# Patient Record
Sex: MALE | Race: WHITE | Employment: OTHER | URBAN - NONMETROPOLITAN AREA
[De-identification: names, ages, dates, MRNs, and addresses within clinical notes are randomized per-mention and may not be internally consistent; named-entity substitution may affect disease eponyms.]

---

## 2023-12-04 ENCOUNTER — APPOINTMENT (OUTPATIENT)
Dept: CT IMAGING | Age: 83
DRG: 085 | End: 2023-12-04
Payer: MEDICARE

## 2023-12-04 ENCOUNTER — HOSPITAL ENCOUNTER (INPATIENT)
Age: 83
LOS: 7 days | Discharge: HOSPICE | DRG: 085 | End: 2023-12-11
Attending: EMERGENCY MEDICINE | Admitting: HOSPITALIST
Payer: MEDICARE

## 2023-12-04 ENCOUNTER — APPOINTMENT (OUTPATIENT)
Dept: GENERAL RADIOLOGY | Age: 83
DRG: 085 | End: 2023-12-04
Payer: MEDICARE

## 2023-12-04 DIAGNOSIS — S02.91XB OPEN FRACTURE OF SKULL, UNSPECIFIED BONE, INITIAL ENCOUNTER (HCC): ICD-10-CM

## 2023-12-04 DIAGNOSIS — S01.01XA LACERATION OF SCALP, INITIAL ENCOUNTER: ICD-10-CM

## 2023-12-04 DIAGNOSIS — S09.90XA INJURY OF HEAD, INITIAL ENCOUNTER: Primary | ICD-10-CM

## 2023-12-04 DIAGNOSIS — I62.9 INTRACRANIAL HEMORRHAGE (HCC): ICD-10-CM
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
## 2023-12-04 LAB
ALBUMIN SERPL-MCNC: 4.2 G/DL (ref 3.5–5.2)
ALP SERPL-CCNC: 68 U/L (ref 40–130)
ALT SERPL-CCNC: 19 U/L (ref 5–41)
ANION GAP SERPL CALCULATED.3IONS-SCNC: 12 MMOL/L (ref 7–19)
APTT PPP: 29.2 SEC (ref 26–36.2)
AST SERPL-CCNC: 28 U/L (ref 5–40)
BASOPHILS # BLD: 0 K/UL (ref 0–0.2)
BASOPHILS NFR BLD: 0.6 % (ref 0–1)
BILIRUB SERPL-MCNC: 0.6 MG/DL (ref 0.2–1.2)
BILIRUB UR QL STRIP: NEGATIVE
BUN SERPL-MCNC: 16 MG/DL (ref 8–23)
CALCIUM SERPL-MCNC: 9.1 MG/DL (ref 8.8–10.2)
CHLORIDE SERPL-SCNC: 94 MMOL/L (ref 98–111)
CK SERPL-CCNC: 123 U/L (ref 39–308)
CLARITY UR: CLEAR
CO2 SERPL-SCNC: 24 MMOL/L (ref 22–29)
COLOR UR: YELLOW
CREAT SERPL-MCNC: 0.5 MG/DL (ref 0.5–1.2)
EOSINOPHIL # BLD: 0.1 K/UL (ref 0–0.6)
EOSINOPHIL NFR BLD: 1.1 % (ref 0–5)
ERYTHROCYTE [DISTWIDTH] IN BLOOD BY AUTOMATED COUNT: 14.5 % (ref 11.5–14.5)
ETHANOLAMINE SERPL-MCNC: 18 MG/DL (ref 0–0.08)
GLUCOSE SERPL-MCNC: 104 MG/DL (ref 74–109)
GLUCOSE UR STRIP.AUTO-MCNC: NEGATIVE MG/DL
HCT VFR BLD AUTO: 38.5 % (ref 42–52)
HGB BLD-MCNC: 13 G/DL (ref 14–18)
HGB UR STRIP.AUTO-MCNC: NEGATIVE MG/L
IMM GRANULOCYTES # BLD: 0 K/UL
INR PPP: 1.09 (ref 0.88–1.18)
KETONES UR STRIP.AUTO-MCNC: NEGATIVE MG/DL
LEUKOCYTE ESTERASE UR QL STRIP.AUTO: NEGATIVE
LYMPHOCYTES # BLD: 1.4 K/UL (ref 1.1–4.5)
LYMPHOCYTES NFR BLD: 26.5 % (ref 20–40)
MCH RBC QN AUTO: 31.2 PG (ref 27–31)
MCHC RBC AUTO-ENTMCNC: 33.8 G/DL (ref 33–37)
MCV RBC AUTO: 92.3 FL (ref 80–94)
MONOCYTES # BLD: 0.6 K/UL (ref 0–0.9)
MONOCYTES NFR BLD: 10.5 % (ref 0–10)
NEUTROPHILS # BLD: 3.3 K/UL (ref 1.5–7.5)
NEUTS SEG NFR BLD: 60.6 % (ref 50–65)
NITRITE UR QL STRIP.AUTO: NEGATIVE
PH UR STRIP.AUTO: 5.5 [PH] (ref 5–8)
PLATELET # BLD AUTO: 211 K/UL (ref 130–400)
PMV BLD AUTO: 8.7 FL (ref 9.4–12.4)
POTASSIUM SERPL-SCNC: 4.5 MMOL/L (ref 3.5–5)
PROT SERPL-MCNC: 5.8 G/DL (ref 6.6–8.7)
PROT UR STRIP.AUTO-MCNC: NEGATIVE MG/DL
PROTHROMBIN TIME: 13.8 SEC (ref 12–14.6)
RBC # BLD AUTO: 4.17 M/UL (ref 4.7–6.1)
SODIUM SERPL-SCNC: 130 MMOL/L (ref 136–145)
SP GR UR STRIP.AUTO: 1.01 (ref 1–1.03)
T4 FREE SERPL-MCNC: 1.07 NG/DL (ref 0.93–1.7)
TROPONIN, HIGH SENSITIVITY: 18 NG/L (ref 0–22)
TSH SERPL DL<=0.005 MIU/L-ACNC: 7.42 UIU/ML (ref 0.35–5.5)
UROBILINOGEN UR STRIP.AUTO-MCNC: 1 E.U./DL
WBC # BLD AUTO: 5.4 K/UL (ref 4.8–10.8)

## 2023-12-04 PROCEDURE — 99285 EMERGENCY DEPT VISIT HI MDM: CPT

## 2023-12-04 PROCEDURE — 6360000002 HC RX W HCPCS: Performed by: EMERGENCY MEDICINE

## 2023-12-04 PROCEDURE — 36415 COLL VENOUS BLD VENIPUNCTURE: CPT

## 2023-12-04 PROCEDURE — 84439 ASSAY OF FREE THYROXINE: CPT

## 2023-12-04 PROCEDURE — 82550 ASSAY OF CK (CPK): CPT

## 2023-12-04 PROCEDURE — 80053 COMPREHEN METABOLIC PANEL: CPT

## 2023-12-04 PROCEDURE — 6360000002 HC RX W HCPCS: Performed by: NURSE PRACTITIONER

## 2023-12-04 PROCEDURE — 90715 TDAP VACCINE 7 YRS/> IM: CPT | Performed by: EMERGENCY MEDICINE

## 2023-12-04 PROCEDURE — 12002 RPR S/N/AX/GEN/TRNK2.6-7.5CM: CPT

## 2023-12-04 PROCEDURE — 85025 COMPLETE CBC W/AUTO DIFF WBC: CPT

## 2023-12-04 PROCEDURE — 0HQ0XZZ REPAIR SCALP SKIN, EXTERNAL APPROACH: ICD-10-PCS | Performed by: EMERGENCY MEDICINE

## 2023-12-04 PROCEDURE — 84443 ASSAY THYROID STIM HORMONE: CPT

## 2023-12-04 PROCEDURE — 6360000002 HC RX W HCPCS

## 2023-12-04 PROCEDURE — 2500000003 HC RX 250 WO HCPCS: Performed by: EMERGENCY MEDICINE

## 2023-12-04 PROCEDURE — 85730 THROMBOPLASTIN TIME PARTIAL: CPT

## 2023-12-04 PROCEDURE — 70450 CT HEAD/BRAIN W/O DYE: CPT

## 2023-12-04 PROCEDURE — 81003 URINALYSIS AUTO W/O SCOPE: CPT

## 2023-12-04 PROCEDURE — 84484 ASSAY OF TROPONIN QUANT: CPT

## 2023-12-04 PROCEDURE — 2580000003 HC RX 258: Performed by: EMERGENCY MEDICINE

## 2023-12-04 PROCEDURE — 72170 X-RAY EXAM OF PELVIS: CPT

## 2023-12-04 PROCEDURE — 70486 CT MAXILLOFACIAL W/O DYE: CPT

## 2023-12-04 PROCEDURE — 85610 PROTHROMBIN TIME: CPT

## 2023-12-04 PROCEDURE — 71045 X-RAY EXAM CHEST 1 VIEW: CPT

## 2023-12-04 PROCEDURE — 82077 ASSAY SPEC XCP UR&BREATH IA: CPT

## 2023-12-04 PROCEDURE — 93005 ELECTROCARDIOGRAM TRACING: CPT | Performed by: EMERGENCY MEDICINE

## 2023-12-04 PROCEDURE — 72125 CT NECK SPINE W/O DYE: CPT

## 2023-12-04 PROCEDURE — 2000000000 HC ICU R&B

## 2023-12-04 PROCEDURE — 74018 RADEX ABDOMEN 1 VIEW: CPT

## 2023-12-04 PROCEDURE — 90471 IMMUNIZATION ADMIN: CPT | Performed by: EMERGENCY MEDICINE

## 2023-12-04 RX ORDER — METOPROLOL TARTRATE 1 MG/ML
2.5 INJECTION, SOLUTION INTRAVENOUS EVERY 6 HOURS PRN
Status: DISCONTINUED | OUTPATIENT
Start: 2023-12-04 | End: 2023-12-11 | Stop reason: HOSPADM

## 2023-12-04 RX ORDER — SODIUM CHLORIDE 0.9 % (FLUSH) 0.9 %
5-40 SYRINGE (ML) INJECTION EVERY 12 HOURS SCHEDULED
Status: DISCONTINUED | OUTPATIENT
Start: 2023-12-04 | End: 2023-12-05

## 2023-12-04 RX ORDER — LORAZEPAM 2 MG/ML
INJECTION INTRAMUSCULAR
Status: COMPLETED
Start: 2023-12-04 | End: 2023-12-04

## 2023-12-04 RX ORDER — LORAZEPAM 2 MG/ML
1 INJECTION INTRAMUSCULAR ONCE
Status: DISCONTINUED | OUTPATIENT
Start: 2023-12-04 | End: 2023-12-11 | Stop reason: HOSPADM

## 2023-12-04 RX ORDER — SODIUM CHLORIDE 9 MG/ML
INJECTION, SOLUTION INTRAVENOUS PRN
Status: DISCONTINUED | OUTPATIENT
Start: 2023-12-04 | End: 2023-12-11 | Stop reason: HOSPADM

## 2023-12-04 RX ORDER — ONDANSETRON 2 MG/ML
4 INJECTION INTRAMUSCULAR; INTRAVENOUS EVERY 6 HOURS PRN
Status: DISCONTINUED | OUTPATIENT
Start: 2023-12-04 | End: 2023-12-11 | Stop reason: HOSPADM

## 2023-12-04 RX ORDER — SODIUM CHLORIDE 9 MG/ML
INJECTION, SOLUTION INTRAVENOUS CONTINUOUS
Status: DISCONTINUED | OUTPATIENT
Start: 2023-12-04 | End: 2023-12-11 | Stop reason: HOSPADM

## 2023-12-04 RX ORDER — ACETAMINOPHEN 325 MG/1
650 TABLET ORAL EVERY 6 HOURS PRN
Status: DISCONTINUED | OUTPATIENT
Start: 2023-12-04 | End: 2023-12-11 | Stop reason: HOSPADM

## 2023-12-04 RX ORDER — MAGNESIUM SULFATE IN WATER 40 MG/ML
2000 INJECTION, SOLUTION INTRAVENOUS PRN
Status: DISCONTINUED | OUTPATIENT
Start: 2023-12-04 | End: 2023-12-11 | Stop reason: HOSPADM

## 2023-12-04 RX ORDER — ONDANSETRON 4 MG/1
4 TABLET, ORALLY DISINTEGRATING ORAL EVERY 8 HOURS PRN
Status: DISCONTINUED | OUTPATIENT
Start: 2023-12-04 | End: 2023-12-11 | Stop reason: HOSPADM

## 2023-12-04 RX ORDER — POLYETHYLENE GLYCOL 3350 17 G/17G
17 POWDER, FOR SOLUTION ORAL DAILY PRN
Status: DISCONTINUED | OUTPATIENT
Start: 2023-12-04 | End: 2023-12-11 | Stop reason: HOSPADM

## 2023-12-04 RX ORDER — PROMETHAZINE HYDROCHLORIDE 25 MG/ML
6.25 INJECTION, SOLUTION INTRAMUSCULAR; INTRAVENOUS EVERY 6 HOURS PRN
Status: DISCONTINUED | OUTPATIENT
Start: 2023-12-04 | End: 2023-12-11 | Stop reason: HOSPADM

## 2023-12-04 RX ORDER — LORAZEPAM 2 MG/ML
2 INJECTION INTRAMUSCULAR EVERY 4 HOURS PRN
Status: DISCONTINUED | OUTPATIENT
Start: 2023-12-04 | End: 2023-12-11 | Stop reason: HOSPADM

## 2023-12-04 RX ORDER — SODIUM CHLORIDE 0.9 % (FLUSH) 0.9 %
5-40 SYRINGE (ML) INJECTION PRN
Status: DISCONTINUED | OUTPATIENT
Start: 2023-12-04 | End: 2023-12-11 | Stop reason: HOSPADM

## 2023-12-04 RX ORDER — LABETALOL HYDROCHLORIDE 5 MG/ML
10 INJECTION, SOLUTION INTRAVENOUS EVERY 4 HOURS PRN
Status: DISCONTINUED | OUTPATIENT
Start: 2023-12-04 | End: 2023-12-04

## 2023-12-04 RX ORDER — LORAZEPAM 2 MG/ML
1 INJECTION INTRAMUSCULAR ONCE
Status: COMPLETED | OUTPATIENT
Start: 2023-12-04 | End: 2023-12-04

## 2023-12-04 RX ORDER — POTASSIUM CHLORIDE 7.45 MG/ML
10 INJECTION INTRAVENOUS PRN
Status: DISCONTINUED | OUTPATIENT
Start: 2023-12-04 | End: 2023-12-11 | Stop reason: HOSPADM

## 2023-12-04 RX ORDER — ACETAMINOPHEN 650 MG/1
650 SUPPOSITORY RECTAL EVERY 6 HOURS PRN
Status: DISCONTINUED | OUTPATIENT
Start: 2023-12-04 | End: 2023-12-11 | Stop reason: HOSPADM

## 2023-12-04 RX ORDER — LIDOCAINE HYDROCHLORIDE AND EPINEPHRINE 10; 10 MG/ML; UG/ML
20 INJECTION, SOLUTION INFILTRATION; PERINEURAL ONCE
Status: COMPLETED | OUTPATIENT
Start: 2023-12-04 | End: 2023-12-04

## 2023-12-04 RX ORDER — LEVETIRACETAM 500 MG/5ML
1000 INJECTION, SOLUTION, CONCENTRATE INTRAVENOUS ONCE
Status: COMPLETED | OUTPATIENT
Start: 2023-12-04 | End: 2023-12-04

## 2023-12-04 RX ORDER — LEVETIRACETAM 500 MG/5ML
500 INJECTION, SOLUTION, CONCENTRATE INTRAVENOUS EVERY 12 HOURS
Status: DISCONTINUED | OUTPATIENT
Start: 2023-12-05 | End: 2023-12-05

## 2023-12-04 RX ORDER — POTASSIUM CHLORIDE 29.8 MG/ML
20 INJECTION INTRAVENOUS PRN
Status: DISCONTINUED | OUTPATIENT
Start: 2023-12-04 | End: 2023-12-11 | Stop reason: HOSPADM

## 2023-12-04 RX ADMIN — LORAZEPAM 1 MG: 2 INJECTION INTRAMUSCULAR at 22:42

## 2023-12-04 RX ADMIN — LIDOCAINE HYDROCHLORIDE,EPINEPHRINE BITARTRATE 20 ML: 10; .01 INJECTION, SOLUTION INFILTRATION; PERINEURAL at 20:42

## 2023-12-04 RX ADMIN — CEFAZOLIN 2000 MG: 2 INJECTION, POWDER, FOR SOLUTION INTRAMUSCULAR; INTRAVENOUS at 20:34

## 2023-12-04 RX ADMIN — LEVETIRACETAM 1000 MG: 100 INJECTION, SOLUTION, CONCENTRATE INTRAVENOUS at 22:49

## 2023-12-04 RX ADMIN — ONDANSETRON 4 MG: 2 INJECTION INTRAMUSCULAR; INTRAVENOUS at 21:15

## 2023-12-04 RX ADMIN — TETANUS TOXOID, REDUCED DIPHTHERIA TOXOID AND ACELLULAR PERTUSSIS VACCINE, ADSORBED 0.5 ML: 5; 2.5; 8; 8; 2.5 SUSPENSION INTRAMUSCULAR at 20:33

## 2023-12-04 RX ADMIN — LORAZEPAM 1 MG: 2 INJECTION INTRAMUSCULAR; INTRAVENOUS at 22:42

## 2023-12-05 ENCOUNTER — APPOINTMENT (OUTPATIENT)
Dept: CT IMAGING | Age: 83
DRG: 085 | End: 2023-12-05
Payer: MEDICARE

## 2023-12-05 PROBLEM — S09.90XA HEAD INJURY: Status: ACTIVE | Noted: 2023-12-05

## 2023-12-05 LAB
25(OH)D3 SERPL-MCNC: 14.7 NG/ML
ANION GAP SERPL CALCULATED.3IONS-SCNC: 10 MMOL/L (ref 7–19)
BUN SERPL-MCNC: 11 MG/DL (ref 8–23)
CALCIUM SERPL-MCNC: 8.9 MG/DL (ref 8.8–10.2)
CHLORIDE SERPL-SCNC: 96 MMOL/L (ref 98–111)
CO2 SERPL-SCNC: 24 MMOL/L (ref 22–29)
CREAT SERPL-MCNC: 0.4 MG/DL (ref 0.5–1.2)
EKG P AXIS: NORMAL DEGREES
EKG P AXIS: NORMAL DEGREES
EKG P-R INTERVAL: NORMAL MS
EKG P-R INTERVAL: NORMAL MS
EKG Q-T INTERVAL: 430 MS
EKG Q-T INTERVAL: 438 MS
EKG QRS DURATION: 116 MS
EKG QRS DURATION: 182 MS
EKG QTC CALCULATION (BAZETT): 442 MS
EKG QTC CALCULATION (BAZETT): 457 MS
EKG T AXIS: -94 DEGREES
EKG T AXIS: 76 DEGREES
ERYTHROCYTE [DISTWIDTH] IN BLOOD BY AUTOMATED COUNT: 14.2 % (ref 11.5–14.5)
GLUCOSE SERPL-MCNC: 152 MG/DL (ref 74–109)
HCT VFR BLD AUTO: 35.1 % (ref 42–52)
HGB BLD-MCNC: 12.2 G/DL (ref 14–18)
MAGNESIUM SERPL-MCNC: 2.1 MG/DL (ref 1.6–2.4)
MCH RBC QN AUTO: 31.4 PG (ref 27–31)
MCHC RBC AUTO-ENTMCNC: 34.8 G/DL (ref 33–37)
MCV RBC AUTO: 90.2 FL (ref 80–94)
OSMOLALITY SERPL CALC.SUM OF ELEC: 271 MOSM/KG (ref 275–300)
PLATELET # BLD AUTO: 208 K/UL (ref 130–400)
PMV BLD AUTO: 8.8 FL (ref 9.4–12.4)
POTASSIUM SERPL-SCNC: 4.3 MMOL/L (ref 3.5–5)
RBC # BLD AUTO: 3.89 M/UL (ref 4.7–6.1)
SODIUM SERPL-SCNC: 130 MMOL/L (ref 136–145)
TSH SERPL DL<=0.005 MIU/L-ACNC: 1.11 UIU/ML (ref 0.27–4.2)
WBC # BLD AUTO: 8.7 K/UL (ref 4.8–10.8)

## 2023-12-05 PROCEDURE — 6360000002 HC RX W HCPCS: Performed by: PSYCHIATRY & NEUROLOGY

## 2023-12-05 PROCEDURE — 83735 ASSAY OF MAGNESIUM: CPT

## 2023-12-05 PROCEDURE — 36415 COLL VENOUS BLD VENIPUNCTURE: CPT

## 2023-12-05 PROCEDURE — 70450 CT HEAD/BRAIN W/O DYE: CPT

## 2023-12-05 PROCEDURE — 2000000000 HC ICU R&B

## 2023-12-05 PROCEDURE — 80048 BASIC METABOLIC PNL TOTAL CA: CPT

## 2023-12-05 PROCEDURE — 85027 COMPLETE CBC AUTOMATED: CPT

## 2023-12-05 PROCEDURE — 83930 ASSAY OF BLOOD OSMOLALITY: CPT

## 2023-12-05 PROCEDURE — 84443 ASSAY THYROID STIM HORMONE: CPT

## 2023-12-05 PROCEDURE — 6360000002 HC RX W HCPCS: Performed by: INTERNAL MEDICINE

## 2023-12-05 PROCEDURE — 6360000002 HC RX W HCPCS: Performed by: NURSE PRACTITIONER

## 2023-12-05 PROCEDURE — 95816 EEG AWAKE AND DROWSY: CPT

## 2023-12-05 PROCEDURE — 82306 VITAMIN D 25 HYDROXY: CPT

## 2023-12-05 PROCEDURE — 99223 1ST HOSP IP/OBS HIGH 75: CPT | Performed by: NEUROLOGICAL SURGERY

## 2023-12-05 PROCEDURE — 95816 EEG AWAKE AND DROWSY: CPT | Performed by: PSYCHIATRY & NEUROLOGY

## 2023-12-05 PROCEDURE — 2500000003 HC RX 250 WO HCPCS: Performed by: HOSPITALIST

## 2023-12-05 PROCEDURE — 99223 1ST HOSP IP/OBS HIGH 75: CPT | Performed by: PSYCHIATRY & NEUROLOGY

## 2023-12-05 PROCEDURE — 93010 ELECTROCARDIOGRAM REPORT: CPT | Performed by: INTERNAL MEDICINE

## 2023-12-05 PROCEDURE — 2580000003 HC RX 258: Performed by: NURSE PRACTITIONER

## 2023-12-05 RX ORDER — MORPHINE SULFATE 2 MG/ML
2 INJECTION, SOLUTION INTRAMUSCULAR; INTRAVENOUS ONCE
Status: COMPLETED | OUTPATIENT
Start: 2023-12-05 | End: 2023-12-05

## 2023-12-05 RX ORDER — FOLIC ACID 1 MG/1
1 TABLET ORAL DAILY
Status: DISCONTINUED | OUTPATIENT
Start: 2023-12-05 | End: 2023-12-11 | Stop reason: HOSPADM

## 2023-12-05 RX ORDER — THIAMINE HYDROCHLORIDE 100 MG/ML
100 INJECTION, SOLUTION INTRAMUSCULAR; INTRAVENOUS DAILY
Status: DISCONTINUED | OUTPATIENT
Start: 2023-12-05 | End: 2023-12-11 | Stop reason: HOSPADM

## 2023-12-05 RX ORDER — MULTIVITAMIN WITH IRON
1 TABLET ORAL DAILY
Status: DISCONTINUED | OUTPATIENT
Start: 2023-12-05 | End: 2023-12-11 | Stop reason: HOSPADM

## 2023-12-05 RX ORDER — LEVETIRACETAM 500 MG/5ML
750 INJECTION, SOLUTION, CONCENTRATE INTRAVENOUS EVERY 12 HOURS
Status: DISCONTINUED | OUTPATIENT
Start: 2023-12-05 | End: 2023-12-06

## 2023-12-05 RX ORDER — ERGOCALCIFEROL 1.25 MG/1
50000 CAPSULE ORAL WEEKLY
Status: DISCONTINUED | OUTPATIENT
Start: 2023-12-05 | End: 2023-12-11 | Stop reason: HOSPADM

## 2023-12-05 RX ADMIN — METOPROLOL TARTRATE 2.5 MG: 5 INJECTION INTRAVENOUS at 03:00

## 2023-12-05 RX ADMIN — WATER 2000 MG: 1 INJECTION INTRAMUSCULAR; INTRAVENOUS; SUBCUTANEOUS at 19:58

## 2023-12-05 RX ADMIN — WATER 2000 MG: 1 INJECTION INTRAMUSCULAR; INTRAVENOUS; SUBCUTANEOUS at 04:00

## 2023-12-05 RX ADMIN — WATER 2000 MG: 1 INJECTION INTRAMUSCULAR; INTRAVENOUS; SUBCUTANEOUS at 11:59

## 2023-12-05 RX ADMIN — MORPHINE SULFATE 2 MG: 2 INJECTION, SOLUTION INTRAMUSCULAR; INTRAVENOUS at 14:32

## 2023-12-05 RX ADMIN — LEVETIRACETAM 750 MG: 100 INJECTION, SOLUTION, CONCENTRATE INTRAVENOUS at 23:02

## 2023-12-05 RX ADMIN — SODIUM CHLORIDE, PRESERVATIVE FREE 10 ML: 5 INJECTION INTRAVENOUS at 08:00

## 2023-12-05 RX ADMIN — SODIUM CHLORIDE: 9 INJECTION, SOLUTION INTRAVENOUS at 01:15

## 2023-12-05 RX ADMIN — LEVETIRACETAM 500 MG: 100 INJECTION, SOLUTION, CONCENTRATE INTRAVENOUS at 10:09

## 2023-12-05 RX ADMIN — THIAMINE HYDROCHLORIDE 100 MG: 100 INJECTION, SOLUTION INTRAMUSCULAR; INTRAVENOUS at 14:31

## 2023-12-05 ASSESSMENT — PAIN SCALES - GENERAL
PAINLEVEL_OUTOF10: 5
PAINLEVEL_OUTOF10: 0

## 2023-12-05 NOTE — PROCEDURES
ADULT INPATIENT ELECTROENCEPHALOGRAM REPORT    Patient:   Vangie Butler  MR#:    [id number]  Room #:    INPATIENT  YOB: 1940  Date of Evaluation:  12/5/2023  Referring Physician:   Jozef Owusu MD      CLINICAL INFORMATION:     This patient is a 80 y.o. male with a history of seizures. MEDICATIONS:     See MAR. RECORDING CONDITIONS:     This EEG was performed utilizing standard International 10-20 System of electrode placement, with additional channels monitored for eye movement. One channel electrocardiogram was monitored. Data was obtained, stored, and interpreted according to ACNS guidelines (J Clin Neurophysiol 2006;23(2):) utilizing referential montage recording, with reformatting to longitudinal, transverse bipolar, and referential montages as necessary for interpretation, along with the digital/automated EEG analysis. Patient tolerated entire procedure well. Photic stimulation and hyperventilation were utilized as activation procedures unless otherwise specified below. E.E.G. DESCRIPTION:     The recording was limited by significant movement and muscle artifact. The background consists of lower voltage slowing with intermixed faster beta frequencies. No overt electrographic seizure activity was seen. No overt epileptiform abnormalities were noted. Drowsiness and sleep was not demonstrated. Hyperventilation was not performed. Photic stimulation was performed and had little change on the recording. EEG INTERPRETATION:    No overt electrographic seizure activity or epileptiform abnormalities were noted.        Sunny Gill DO  Board Certified Neurologist    Date reported: 12/5/2023  Date signed: 12/5/2023

## 2023-12-05 NOTE — CONSULTS
[facility] [facility] Neurosurgery Consultation        REASON FOR CONSULTATION:  Fall, traumatic brain injury, intracranial hemorrhage, skull fracture      HISTORY OF PRESENT ILLNESS:      The patient is a 80 y.o. adult who presented to the [facility] ED by EMS after a fall down 4-5 steps and sustaining significant head trauma. He is currently nonverbal and history is obtained from nursing and review of medical records. He is apparently visiting from [place] with his wife and was unloading his car last evening when he sustaining a fall down some stairs. He was found down by his wife and EMS was called. He was reported to be minimally responsive by EMS and was transported to the [facility] ED. In the ED, a CT of his head was obtained revealing a skull fracture and multi-compartment intracranial hemorrhage and I have been asked to provide neurosurgical consultation. After arrival in the ED, he had a witnessed seizure and was treated with Ativan and started on keppra. He has not had any further seizure activity. He had an occipital scalp laceration that was closed in the ED. He was somnolent after the seizure but has recovered somewhat overnight. His head CT was repeated after the seizure and again this AM.  He is  currently awake and regards but is nonverbal.  Any attempts at speech are garbled and nonsensical.  He is moving all extremities spontaneously and symmetrically but is not following any commands. He is mildly agitated this AM.  He has a history of pacemaker placement and prior medical records suggest that he was taking baby aspiring prior to admission. MEDICAL HISTORY:             History reviewed. No pertinent past medical history.     Past Surgical History:   Procedure Laterality Date    PACEMAKER PLACEMENT           Current Facility-Administered Medications:     sodium chloride flush 0.9 % injection 5-40 mL, 5-40 mL, IntraVENous, 2 times per day, KASANDRA Chirions CNP, 10 mL at 12/05/23 0800    sodium

## 2023-12-05 NOTE — ED NOTES
5762 Dr. Patience Joseph (hospitalist) notified of patient seizing.       Evonne Douglas  12/04/23 8009

## 2023-12-05 NOTE — H&P
with endplate sclerosis and osteophyte formation. Bony spinal canal is not significantly compromised. Moderate to severe multilevel bilateral neural foraminal narrowing secondary to uncovertebral and facet hypertrophy. Osteopenia. Impression: 1. No acute osseous abnormality of the cervical spine. 2.  Severe degenerative changes  All CT scans are performed using dose optimization techniques as appropriate to the performed exam and include at least one of the following: Automated exposure control, adjustment of the mA and/or kV according to size, and the use of iterative reconstruction technique. ______________________________________ Electronically signed by: Papi Raines M.D. Date:     12/04/2023 Time:    19:24     XR PELVIS (1-2 VIEWS)    Result Date: 12/4/2023  EXAM:  SINGLE VIEW OF THE PELVIS. History:  Pelvic trauma. FINDINGS:  No acute fracture or dislocation. Grossly intact left hip arthroplasty hardware. Atherosclerotic vascular calcifications. Mild to moderate narrowing of the right hip joint. Impression:  No acute osseous abnormality   ______________________________________ Electronically signed by: Papi Raines M.D. Date:     12/04/2023 Time:    19:30       Assessment/Plan:  Principal Problem:    Intracranial hemorrhage (HCC)  Resolved Problems:    * No resolved hospital problems. *     Principal Problem:    Intracranial hemorrhage/Skull fracture   -admit icu  -consult neurosurgery  -neuro checks q1hrs  -scds for vte prophylaxis  -I's and O's  -daily weight  -fall precautions  -ancef 2g iv q8hrs  -ns at 75cc/hr  -telemetry  -interrogate packemaker  -tsh w/reflex FT4  -kub  -avoid offending agents  -follow cbc/coags  -elevate hob  -keppra 500mg iv q12hrs  -seizure precautions   -npo until swallow eval  -aggressive pt/ot/slp  -scds    Hyponatremia  -serum osm/urine osm/urine lytes  -follow na closely  Resolved Problems:    * No resolved hospital problems.  *  Signed:  Alley Frausto 12/4/2023 10:42 PM      Attestation Statement     I have independently seen and examined this patient and agree with the asesment and plan by mid level provider                          Objective:   Vitals: BP (!) 148/67   Pulse 75   Temp (!) 102.5 °F (39.2 °C)   Resp 21   Ht 1.829 m (6' 0.01\")   Wt 61.9 kg (136 lb 8 oz)   SpO2 93%   BMI 18.51 kg/m²   General appearance: altered   Skin: Skin color, texture, turgor normal.   HEENT: Head: Normocephalic, no lesions, without obvious abnormality.  Neck: no adenopathy, no carotid bruit, no JVD, and supple, symmetrical, trachea midline  Lungs: clear to auscultation bilaterally  Heart: regular rate and rhythm, S1, S2 normal, no murmur, click, rub or gallop  Abdomen: soft, non-tender; bowel sounds normal; no masses,  no organomegaly  Extremities: extremities normal, atraumatic, no cyanosis or edema  Lymphatic: No significant lymph node enlargement papable  Neurologic: Mental status: altered       Assessment & Plan:    Fall with head trauma  Multi focal intracranial hemorrhages and skull fracture- NS consulted- CT head am, EEG- IV ab    Constanza Reich MD

## 2023-12-05 NOTE — CONSULTS
12[facility] Neurology Consult      Patient:   Susana Castellanos  MR#:    [id number]  Account Number:                   [id number]      Room:    [address] [place] [address]   YOB: 1940  Date of Progress Note: 12/5/2023  Time of Note                           3:36 PM  Attending Physician:  Jan Collazo MD  Consulting Physician:  Amy De La Torre DO       CHIEF COMPLAINT:  AMS, TBI, ICH    HISTORY OF PRESENT ILLNESS:   This is a 80 y.o. male who was admitted with a traumatic brain injury with subsequent intracerebral hemorrhage. The patient presented to the emergency department after a fall down 4-5 steps and sustaining significant head trauma. He was found down by his wife after sustaining a fall down stairs. CT of his head revealed a skull fracture and multi compartment intracranial hemorrhage. He had a witnessed generalized tonic-clonic seizure which was treated with Ativan and he was placed on Keppra. He has not had any further clinical seizure activity since. Though he does remain largely nonverbal and has not currently following commands. REVIEW OF SYSTEMS:  Limited given AMS     PAST MEDICAL HISTORY:  History reviewed. No pertinent past medical history. PAST SURGICAL HISTORY:      Procedure Laterality Date    PACEMAKER PLACEMENT         SOCIAL HISTORY:   TOBACCO:   reports that he has never smoked. He has never used smokeless tobacco.  ETOH:   reports current alcohol use. DRUG:    Social History     Substance and Sexual Activity   Drug Use Never       FAMILY HISTORY:   History reviewed. No pertinent family history.     MEDICATIONS:      Current Facility-Administered Medications:     thiamine (B-1) injection 100 mg, 100 mg, IntraVENous, Daily, Jan Collazo MD, 100 mg at 12/05/23 1431    multivitamin 1 tablet, 1 tablet, Oral, Daily, Jan Collazo MD    folic acid (FOLVITE) tablet 1 mg, 1 mg, Oral, Daily, Jan Collazo MD    sodium chloride flush 0.9 % injection 5-40 mL, 5-40 mL, IntraVENous, PRN,

## 2023-12-05 NOTE — PROGRESS NOTES
4 Eyes Skin Assessment     NAME:  Jude Dennis  YOB: 1940  MEDICAL RECORD NUMBER:  [id number]    The patient is being assessed for  Admission    I agree that at least one RN has performed a thorough Head to Toe Skin Assessment on the patient. ALL assessment sites listed below have been assessed. Areas assessed by both nurses:    Head, Face, Ears, Shoulders, Back, Chest, Arms, Elbows, Hands, Sacrum. Buttock, Coccyx, Ischium, Legs. Feet and Heels, and Under Medical Devices         Does the Patient have a Wound?  No noted wound(s)       Juliano Prevention initiated by RN: Yes  Wound Care Orders initiated by RN: No    Pressure Injury (Stage 3,4, Unstageable, DTI, NWPT, and Complex wounds) if present, place Wound referral order by RN under : No    New Ostomies, if present place, Ostomy referral order under : No     Nurse 1 eSignature: Electronically signed by Mag Martinez RN on 12/5/23 at 5:59 AM CST    **SHARE this note so that the co-signing nurse can place an eSignature**    Nurse 2 eSignature: {Esignature:[id number]}

## 2023-12-05 NOTE — PROGRESS NOTES
Hospitalist Progress Note  [facility]     Patient: To Jordan  : 1940  MRN: [id number]  Code Status: Full Code    Hospital Day: 1   Date of Service: 2023    Subjective:   Patient seen and examined.  No acute distress.    History reviewed. No pertinent past medical history.    Past Surgical History:   Procedure Laterality Date    PACEMAKER PLACEMENT         History reviewed. No pertinent family history.    Social History     Socioeconomic History    Marital status:      Spouse name: Not on file    Number of children: Not on file    Years of education: Not on file    Highest education level: Not on file   Occupational History    Not on file   Tobacco Use    Smoking status: Never    Smokeless tobacco: Never   Substance and Sexual Activity    Alcohol use: Yes     Comment: beer daily    Drug use: Never    Sexual activity: Not on file   Other Topics Concern    Not on file   Social History Narrative    Not on file     Social Determinants of Health     Financial Resource Strain: Not on file   Food Insecurity: Not on file   Transportation Needs: Not on file   Physical Activity: Not on file   Stress: Not on file   Social Connections: Not on file   Intimate Partner Violence: Not on file   Housing Stability: Not on file       Current Facility-Administered Medications   Medication Dose Route Frequency Provider Last Rate Last Admin    sodium chloride flush 0.9 % injection 5-40 mL  5-40 mL IntraVENous 2 times per day Robert Valadez APRN - CNP   10 mL at 23 0800    sodium chloride flush 0.9 % injection 5-40 mL  5-40 mL IntraVENous PRN Robert Valadez APRN - CNP        0.9 % sodium chloride infusion   IntraVENous PRN Robert Valadez APRN - CNP        potassium chloride 20 mEq/50 mL IVPB (Central Line)  20 mEq IntraVENous PRN Robert Valadez APRN - CNP        Or    potassium chloride 10 mEq/100 mL IVPB (Peripheral Line)  10 mEq IntraVENous PRN Robert Valadez APRN - CNP        magnesium  temporal lobe axial image 10 measuring approximately 1.6 x 1.5 cm and anterior right frontal suspect a hemorrhagic contusion axial image 19. Surrounding edema in the left temporal lobe has probably not significantly changed, probably just better seen due to differences in technique. Gray-white differentiation maintained without acute large vascular territory infarction. There is no hydrocephalus or midline shift. Encephalomalacia left cerebral hemisphere again noted. Basal cisterns are well visualized. Nondisplaced fracture line in the midline along the frontal and parietal bones, best seen on axial images 26-32 with additional oblique fracture lines extending into the left parietal bone and right parietal bone on axial image 32. .  Soft tissue swelling  noted, greater in the frontal regions.  --------------------------------    1. No significant change in the multifocal bilateral subarachnoid hemorrhage, bilateral subdural hematomas, and probable hemorrhagic contusions. Stable edema. No midline shift. 2.  Stable nondisplaced calvarial fractures. ---------------------------  All CT scans are performed using dose optimization techniques as appropriate to the performed exam and include at least one of the following: Automated exposure control, adjustment of the mA and/or kV according to size, and the use of iterative reconstruction technique. ______________________________________ Electronically signed by: Lenora Aburto M.D. Date:     12/05/2023 Time:    08:04     CT HEAD WO CONTRAST    Result Date: 12/5/2023  EXAM:  CT OF THE HEAD WITHOUT CONTRAST  History:  Follow-up brain hemorrhage. Technique:  Multiplanar CT images through the head were obtained without the administration of IV contrast  Comparison:  Head CT 12/04/2023  FINDINGS:  The visualized paranasal sinuses and mastoid air cells are clear in general.  Stable skull fracture. Intracranially, the ventricular cisternal spaces are stable.   No midline

## 2023-12-05 NOTE — ED NOTES
Pt having seizure activity. Pt convulsing. Pt turned on side. Dr Meghana Bal notified. Dr Meghana Bal at bedside. Seizure activity lasted about 1 minute. Seizure precautions. Pt sating 70's during seizure. Pt placed on 2L NC and sating 90's after applied.  Pt postictal.      Chichi Stallworth RN  12/04/23 0824 [address]  12/04/23 5007

## 2023-12-05 NOTE — PROGRESS NOTES
Susana Castellanos arrived to room # 148. Presented with: Intracranial Hemorrhage  Mental Status: Patient is disoriented. Vitals:    12/05/23 0500   BP: (!) 147/74   Pulse: 78   Resp: 16   Temp:    SpO2: 98%     Patient safety contract and falls prevention contract reviewed with patient Yes. Oriented Patient to room. Call light within reach. Yes.   Needs, issues or concerns expressed at this time: no.      Electronically signed by Jason Connell RN on 12/5/2023 at 5:58 AM

## 2023-12-05 NOTE — ED PROVIDER NOTES
[facility] EMERGENCY DEPT  eMERGENCY dEPARTMENT eNCOUnter      Pt Name: To Jordan  MRN: [id number]  Birthdate 1940  Date of evaluation: 12/4/2023  Provider: Kalyan Vela MD    CHIEF COMPLAINT       Chief Complaint   Patient presents with    Fall     Unwitnessed fall, head injury         HISTORY OF PRESENT ILLNESS   (Location/Symptom, Timing/Onset,Context/Setting, Quality, Duration, Modifying Factors, Severity)  Note limiting factors.   To Jordan is a 83 y.o. adult who presents to the emergency department due to fall.  Brought by EMS.  EMS states patient had fall down 4-5 stairs.  Found by wife.  Was very altered and unresponsive per EMS but more awake and alert now.  Still confused.  Laceration to the back of the head.  Limited history at this time.  Patient unable provide any history.    Family arrived soon after patient.  Said that patient and spouse just arrived here from [place].  Traveled here to visit family.  Patient was unloading the vehicle.  Wife went outside when he did not come back in and found him on the ground.  Does not not know how long he was down but could not have been more than a few minutes.  Was not a witnessed fall.  Tells me that he has history of pacemaker but denies any other chronic medical problems.  She said she feels like he has been off balance for quite some time but this is unchanged from baseline.  She said he does drink alcohol fairly regularly but only had 1 beer this evening.  Family tells me patient was completely asymptomatic prior to the fall tonight    HPI    NursingNotes were reviewed.    REVIEW OF SYSTEMS    (2-9 systems for level 4, 10 or more for level 5)     Review of Systems   Unable to perform ROS: Patient nonverbal       A complete review of systems was performed and is negative except as noted above in the HPI.       PAST MEDICAL HISTORY   History reviewed. No pertinent past medical history.      SURGICAL HISTORY       Past Surgical History:   Procedure  complications      FINAL IMPRESSION     1. Injury of head, initial encounter    2. Laceration of scalp, initial encounter    3. Open fracture of skull, unspecified bone, initial encounter ([address])    4. Intracranial hemorrhage ([address])    5.  Fall, initial encounter          DISPOSITION/PLAN   DISPOSITION Admitted 12/04/2023 08:26:02 PM      PATIENT REFERRED TO:  @FUP@    DISCHARGE MEDICATIONS:  New Prescriptions    No medications on file          (Please note that portions of this note were completed with a voice recognition program.  Efforts were made to edit the dictations butoccasionally words are mis-transcribed.)    Ramiro Gonzales MD (electronically signed)  AttendingEmergency Physician          Ramiro Gonzales MD  12/04/23 7669       Ramiro Gonzales MD  12/05/23 5870

## 2023-12-06 ENCOUNTER — APPOINTMENT (OUTPATIENT)
Dept: CT IMAGING | Age: 83
DRG: 085 | End: 2023-12-06
Payer: MEDICARE

## 2023-12-06 LAB
ANION GAP SERPL CALCULATED.3IONS-SCNC: 10 MMOL/L (ref 7–19)
BASOPHILS # BLD: 0 K/UL (ref 0–0.2)
BASOPHILS NFR BLD: 0.1 % (ref 0–1)
BUN SERPL-MCNC: 10 MG/DL (ref 8–23)
CALCIUM SERPL-MCNC: 7.5 MG/DL (ref 8.8–10.2)
CHLORIDE SERPL-SCNC: 102 MMOL/L (ref 98–111)
CO2 SERPL-SCNC: 22 MMOL/L (ref 22–29)
CREAT SERPL-MCNC: 0.3 MG/DL (ref 0.5–1.2)
EOSINOPHIL # BLD: 0 K/UL (ref 0–0.6)
EOSINOPHIL NFR BLD: 0 % (ref 0–5)
ERYTHROCYTE [DISTWIDTH] IN BLOOD BY AUTOMATED COUNT: 14.3 % (ref 11.5–14.5)
GLUCOSE SERPL-MCNC: 111 MG/DL (ref 74–109)
HCT VFR BLD AUTO: 30.5 % (ref 42–52)
HGB BLD-MCNC: 10.4 G/DL (ref 14–18)
IMM GRANULOCYTES # BLD: 0 K/UL
LYMPHOCYTES # BLD: 0.7 K/UL (ref 1.1–4.5)
LYMPHOCYTES NFR BLD: 8.2 % (ref 20–40)
MAGNESIUM SERPL-MCNC: 1.9 MG/DL (ref 1.6–2.4)
MCH RBC QN AUTO: 31.1 PG (ref 27–31)
MCHC RBC AUTO-ENTMCNC: 34.1 G/DL (ref 33–37)
MCV RBC AUTO: 91.3 FL (ref 80–94)
MONOCYTES # BLD: 1 K/UL (ref 0–0.9)
MONOCYTES NFR BLD: 12.2 % (ref 0–10)
NEUTROPHILS # BLD: 6.6 K/UL (ref 1.5–7.5)
NEUTS SEG NFR BLD: 79.1 % (ref 50–65)
PLATELET # BLD AUTO: 174 K/UL (ref 130–400)
PMV BLD AUTO: 9 FL (ref 9.4–12.4)
POTASSIUM SERPL-SCNC: 3.3 MMOL/L (ref 3.5–5)
POTASSIUM SERPL-SCNC: 4.2 MMOL/L (ref 3.5–5)
RBC # BLD AUTO: 3.34 M/UL (ref 4.7–6.1)
SODIUM SERPL-SCNC: 134 MMOL/L (ref 136–145)
WBC # BLD AUTO: 8.4 K/UL (ref 4.8–10.8)

## 2023-12-06 PROCEDURE — 99291 CRITICAL CARE FIRST HOUR: CPT | Performed by: NEUROLOGICAL SURGERY

## 2023-12-06 PROCEDURE — 84132 ASSAY OF SERUM POTASSIUM: CPT

## 2023-12-06 PROCEDURE — 2580000003 HC RX 258: Performed by: NEUROLOGICAL SURGERY

## 2023-12-06 PROCEDURE — 6360000002 HC RX W HCPCS: Performed by: PSYCHIATRY & NEUROLOGY

## 2023-12-06 PROCEDURE — 6360000002 HC RX W HCPCS: Performed by: NURSE PRACTITIONER

## 2023-12-06 PROCEDURE — 94760 N-INVAS EAR/PLS OXIMETRY 1: CPT

## 2023-12-06 PROCEDURE — 80048 BASIC METABOLIC PNL TOTAL CA: CPT

## 2023-12-06 PROCEDURE — 6360000002 HC RX W HCPCS: Performed by: INTERNAL MEDICINE

## 2023-12-06 PROCEDURE — 2580000003 HC RX 258: Performed by: NURSE PRACTITIONER

## 2023-12-06 PROCEDURE — 36415 COLL VENOUS BLD VENIPUNCTURE: CPT

## 2023-12-06 PROCEDURE — 70450 CT HEAD/BRAIN W/O DYE: CPT

## 2023-12-06 PROCEDURE — 6360000002 HC RX W HCPCS: Performed by: NEUROLOGICAL SURGERY

## 2023-12-06 PROCEDURE — C9113 INJ PANTOPRAZOLE SODIUM, VIA: HCPCS | Performed by: NEUROLOGICAL SURGERY

## 2023-12-06 PROCEDURE — 99233 SBSQ HOSP IP/OBS HIGH 50: CPT | Performed by: PSYCHIATRY & NEUROLOGY

## 2023-12-06 PROCEDURE — 95714 VEEG EA 12-26 HR UNMNTR: CPT

## 2023-12-06 PROCEDURE — 95720 EEG PHY/QHP EA INCR W/VEEG: CPT | Performed by: PSYCHIATRY & NEUROLOGY

## 2023-12-06 PROCEDURE — 83735 ASSAY OF MAGNESIUM: CPT

## 2023-12-06 PROCEDURE — 85025 COMPLETE CBC W/AUTO DIFF WBC: CPT

## 2023-12-06 PROCEDURE — 2000000000 HC ICU R&B

## 2023-12-06 PROCEDURE — 2700000000 HC OXYGEN THERAPY PER DAY

## 2023-12-06 RX ORDER — MORPHINE SULFATE 2 MG/ML
2 INJECTION, SOLUTION INTRAMUSCULAR; INTRAVENOUS ONCE
Status: COMPLETED | OUTPATIENT
Start: 2023-12-06 | End: 2023-12-06

## 2023-12-06 RX ORDER — LEVETIRACETAM 500 MG/5ML
1000 INJECTION, SOLUTION, CONCENTRATE INTRAVENOUS EVERY 12 HOURS
Status: DISCONTINUED | OUTPATIENT
Start: 2023-12-06 | End: 2023-12-11 | Stop reason: HOSPADM

## 2023-12-06 RX ADMIN — POTASSIUM CHLORIDE 10 MEQ: 10 INJECTION, SOLUTION INTRAVENOUS at 07:34

## 2023-12-06 RX ADMIN — MORPHINE SULFATE 2 MG: 2 INJECTION, SOLUTION INTRAMUSCULAR; INTRAVENOUS at 05:01

## 2023-12-06 RX ADMIN — THIAMINE HYDROCHLORIDE 100 MG: 100 INJECTION, SOLUTION INTRAMUSCULAR; INTRAVENOUS at 07:51

## 2023-12-06 RX ADMIN — LEVETIRACETAM 1000 MG: 100 INJECTION, SOLUTION, CONCENTRATE INTRAVENOUS at 22:37

## 2023-12-06 RX ADMIN — WATER 2000 MG: 1 INJECTION INTRAMUSCULAR; INTRAVENOUS; SUBCUTANEOUS at 14:05

## 2023-12-06 RX ADMIN — POTASSIUM CHLORIDE 10 MEQ: 10 INJECTION, SOLUTION INTRAVENOUS at 08:35

## 2023-12-06 RX ADMIN — WATER 2000 MG: 1 INJECTION INTRAMUSCULAR; INTRAVENOUS; SUBCUTANEOUS at 19:11

## 2023-12-06 RX ADMIN — SODIUM CHLORIDE, PRESERVATIVE FREE 40 MG: 5 INJECTION INTRAVENOUS at 10:13

## 2023-12-06 RX ADMIN — WATER 2000 MG: 1 INJECTION INTRAMUSCULAR; INTRAVENOUS; SUBCUTANEOUS at 05:01

## 2023-12-06 RX ADMIN — LEVETIRACETAM 750 MG: 100 INJECTION, SOLUTION, CONCENTRATE INTRAVENOUS at 10:13

## 2023-12-06 RX ADMIN — POTASSIUM CHLORIDE 10 MEQ: 10 INJECTION, SOLUTION INTRAVENOUS at 10:36

## 2023-12-06 RX ADMIN — POTASSIUM CHLORIDE 10 MEQ: 10 INJECTION, SOLUTION INTRAVENOUS at 09:36

## 2023-12-06 ASSESSMENT — PAIN SCALES - GENERAL
PAINLEVEL_OUTOF10: 0

## 2023-12-06 NOTE — PROGRESS NOTES
[facility] Neurology Progress Note      Patient:   To Jordan  MR#:    [id number]   Room:    0148/148-01   YOB: 1940  Date of Progress Note: 12/6/2023  Time of Note                           4:54 PM  Consulting Physician:  Bernardo Stewart DO  Attending Physician:  Sage Ulloa DO      INTERVAL HISTORY:  No acute events, less responsive with left gaze preference, not following commands, repeat CT with largely stable, no shift, no hydrocephalus.     REVIEW OF SYSTEMS:  Unable to obtain     PHYSICAL EXAM:    Constitutional -   BP (!) 149/78   Pulse 68   Temp 98.2 °F (36.8 °C) (Temporal)   Resp 24   Ht 1.829 m (6')   Wt 59.1 kg (130 lb 4.8 oz)   SpO2 98%   BMI 17.67 kg/m²   General appearance: No acute distress   EYES -   Conjunctiva normal  Pupillary exam as below, see CN exam in the neurologic exam  ENT-    No scars, masses, or lesions over external nose or ears  Oropharynx without erythema, palate midline  Cardiovascular -   No clubbing, cyanosis, or edema   Pulmonary-   Good expansion, normal effort without use of accessory muscles  Musculoskeletal -   No significant wasting of muscles noted  Gait as below, see gait exam in the neurologic exam  Muscle strength, tone, stability as below see the motor exam in the neurologic exam.   No bony deformities  Skin -   Warm, dry, and intact to inspection and palpation.    No rash, erythema, or pallor     NEUROLOGICAL EXAM     Mental status    [] Awake, alert, oriented   [] Affect attention and concentration appear appropriate  [] Recent and remote memory appears unremarkable  [] Speech normal without dysarthria or aphasia, comprehension and repetition intact.   COMMENTS:Speech limited, opens eyes to noxious stimulation, not following commands    Cranial Nerves [] No VF deficit to confrontation,  optic discs normal, no papilledema on fundoscopic exam.  [] PERRLA, EOMI, no nystagmus, conjugate eye movements, no ptosis  [] Face symmetric  []  and/or speech notes reviewed       ASSESSMENT:  80 y.o. admitted with TBI, traumatic SAH/ICH, seizure, prolonged AMS. Suspect prolonged AMS secondary his concussive injury and ICH. cEEG with slowing and brief intermittent periods of semi-rhythmic delta slowing. Brief electrographic seizure activity is not completely excluded. Repeat CT stable, neurosurgery following. PLAN:   Neurosurgery following, plans repeat CT in the am     Holding antiplatelet, anticoagulating medications    Increase Keppra to 1000 mg IV q12h, continue cEEG    Ativan prn, seizure precautions. Follow exam      Please feel free to call with any questions. [phone] (cell phone).       Lavon Dickson DO  Board Certified Neurology

## 2023-12-06 NOTE — PROGRESS NOTES
Hospitalist Progress Note    Patient:  To Jordan  YOB: 1940  Date of Service: 12/6/2023  MRN: [id number]   Acct: [id number]   Primary Care Physician: No primary care provider on file.  Advance Directive: Full Code  Admit Date: 12/4/2023       Hospital Day: 2  Referring Provider: Sage Ulloa DO    Patient Seen, Chart, Consults, Notes, Labs, Radiology studies reviewed.    Subjective:  To Jordan is a 83 y.o. male  whom we are following for status post fall, multiple intracranial hemorrhages, skull fracture, posttraumatic seizure, concussion.  Mr. Jodran was admitted on 12/4 after sustaining a fall.  He had the above-noted injuries.  He is awake however he has a fixed leftward gaze.  He does not follow commands.  He had a formal EEG earlier this afternoon.  Results are still pending.    Allergies:  Patient has no known allergies.    Medicines:  Current Facility-Administered Medications   Medication Dose Route Frequency Provider Last Rate Last Admin    pantoprazole (PROTONIX) 40 mg in sodium chloride (PF) 0.9 % 10 mL injection  40 mg IntraVENous Daily Clif Aguirre MD   40 mg at 12/06/23 1013    thiamine (B-1) injection 100 mg  100 mg IntraVENous Daily Aj Christianson MD   100 mg at 12/06/23 0751    multivitamin 1 tablet  1 tablet Oral Daily Aj Christianson MD        folic acid (FOLVITE) tablet 1 mg  1 mg Oral Daily Aj Christianson MD        levETIRAcetam (KEPPRA) injection 750 mg  750 mg IntraVENous Q12H Bernardo Stewart DO   750 mg at 12/06/23 1013    vitamin D (ERGOCALCIFEROL) capsule 50,000 Units  50,000 Units Oral Weekly Constanza Reich MD        sodium chloride flush 0.9 % injection 5-40 mL  5-40 mL IntraVENous PRN Robert Valadez APRN - CNP        0.9 % sodium chloride infusion   IntraVENous PRN Robert Valadez APRN - CNP        potassium chloride 20 mEq/50 mL IVPB (Central Line)  20 mEq IntraVENous PRN Robert Valadez APRN - CNP        Or    potassium chloride 10 mEq/100  and include at least one of the following: Automated exposure control, adjustment of the mA and/or kV according to size, and the use of iterative reconstruction technique. ______________________________________ Electronically signed by: Willow Miller M.D. Date:     12/05/2023 Time:    08:04     CT HEAD WO CONTRAST    Result Date: 12/5/2023  EXAM:  CT OF THE HEAD WITHOUT CONTRAST  History:  Follow-up brain hemorrhage. Technique:  Multiplanar CT images through the head were obtained without the administration of IV contrast  Comparison:  Head CT 12/04/2023  FINDINGS:  The visualized paranasal sinuses and mastoid air cells are clear in general.  Stable skull fracture. Intracranially, the ventricular cisternal spaces are stable. No midline shift and no hydrocephalus. The previously described subdural hemorrhage is minimally increased, especially along the left tentorium. The contusions in the left temporal lobe and right frontal lobe are more noticeable and the post traumatic subarachnoid hemorrhage has slightly increased. Impression:  Slightly increased intracranial hemorrhage. No midline shift and no hydrocephalus. All CT scans are performed using dose optimization techniques as appropriate to the performed exam and include at least one of the following: Automated exposure control, adjustment of the mA and/or kV according to size, and the use of iterative reconstruction technique. ______________________________________ Electronically signed by: Gisell Javed M.D. Date:     12/05/2023 Time:    00:21     XR ABDOMEN (KUB) (SINGLE AP VIEW)    Result Date: 12/4/2023  EXAM:  ABDOMEN ONE-VIEW  HISTORY:  Vomiting  FINDINGS:  Normal bowel gas pattern. No pathologic calcifications over the kidneys or collecting system. Atherosclerotic vascular calcifications are present. No large free intraperitoneal gas. No abundance of retained colonic stool. No acute findings of the skeleton. Left hip arthroplasty.

## 2023-12-06 NOTE — PROGRESS NOTES
[address] [facility] Neurosurgery Critical Care Note        CHIEF COMPLAINT:  Altered mental status      INTERVAL UPDATE:    Patient seen and examined in ICU. He is less responsive this AM.  His eyes are open with a left gaze deviation. He is nonverbal and not following any commands. His gaze will briefly return to midline with noxious stimulation. He is withdrawing to noxious stimuli L>R. Repeat CT completed this AM.      HPI:    The patient is a 80 y.o. adult who presented to the [facility] ED by EMS after a fall down 4-5 steps and sustaining significant head trauma. He is currently nonverbal and history is obtained from nursing and review of medical records. He is apparently visiting from [place] with his wife and was unloading his car last evening when he sustaining a fall down some stairs. He was found down by his wife and EMS was called. He was reported to be minimally responsive by EMS and was transported to the [facility] ED. In the ED, a CT of his head was obtained revealing a skull fracture and multi-compartment intracranial hemorrhage and I have been asked to provide neurosurgical consultation. After arrival in the ED, he had a witnessed seizure and was treated with Ativan and started on keppra. He has not had any further seizure activity. He had an occipital scalp laceration that was closed in the ED. He was somnolent after the seizure but has recovered somewhat overnight. His head CT was repeated after the seizure and again this AM.  He is  currently awake and regards but is nonverbal.  Any attempts at speech are garbled and nonsensical.  He is moving all extremities spontaneously and symmetrically but is not following any commands. He is mildly agitated this AM.  He has a history of pacemaker placement and prior medical records suggest that he was taking baby aspiring prior to admission.          PHYSICAL EXAM:    Vitals:    12/06/23 0700   BP: (!) 149/69   Pulse: 64   Resp: 24   Temp:    SpO2: 96%

## 2023-12-07 ENCOUNTER — APPOINTMENT (OUTPATIENT)
Dept: GENERAL RADIOLOGY | Age: 83
DRG: 085 | End: 2023-12-07
Attending: INTERNAL MEDICINE
Payer: MEDICARE

## 2023-12-07 ENCOUNTER — APPOINTMENT (OUTPATIENT)
Dept: GENERAL RADIOLOGY | Age: 83
DRG: 085 | End: 2023-12-07
Payer: MEDICARE

## 2023-12-07 ENCOUNTER — APPOINTMENT (OUTPATIENT)
Dept: CT IMAGING | Age: 83
DRG: 085 | End: 2023-12-07
Payer: MEDICARE

## 2023-12-07 PROBLEM — Z51.5 PALLIATIVE CARE PATIENT: Status: ACTIVE | Noted: 2023-12-07

## 2023-12-07 PROBLEM — I42.9 CARDIOMYOPATHY (HCC): Status: ACTIVE | Noted: 2023-12-07

## 2023-12-07 PROBLEM — M19.90 OSTEOARTHRITIS: Status: ACTIVE | Noted: 2023-12-07

## 2023-12-07 PROBLEM — E43 SEVERE MALNUTRITION (HCC): Status: ACTIVE | Noted: 2023-12-07

## 2023-12-07 PROBLEM — I10 HYPERTENSION: Status: ACTIVE | Noted: 2023-12-07

## 2023-12-07 PROBLEM — E78.5 HYPERLIPIDEMIA: Status: ACTIVE | Noted: 2023-12-07

## 2023-12-07 PROBLEM — J96.01 ACUTE RESPIRATORY FAILURE WITH HYPOXIA (HCC): Status: ACTIVE | Noted: 2023-12-07

## 2023-12-07 PROBLEM — Z95.0 PACEMAKER: Status: ACTIVE | Noted: 2023-12-07

## 2023-12-07 LAB
ALBUMIN SERPL-MCNC: 3.4 G/DL (ref 3.5–5.2)
ALLENS TEST: ABNORMAL
ALP SERPL-CCNC: 46 U/L (ref 40–130)
ALT SERPL-CCNC: 9 U/L (ref 5–41)
ANION GAP SERPL CALCULATED.3IONS-SCNC: 12 MMOL/L (ref 7–19)
AST SERPL-CCNC: 24 U/L (ref 5–40)
B PARAP IS1001 DNA NPH QL NAA+NON-PROBE: NOT DETECTED
B PERT.PT PRMT NPH QL NAA+NON-PROBE: NOT DETECTED
BASE EXCESS ARTERIAL: 2 MMOL/L (ref -2–2)
BILIRUB SERPL-MCNC: 1 MG/DL (ref 0.2–1.2)
BUN SERPL-MCNC: 10 MG/DL (ref 8–23)
C PNEUM DNA NPH QL NAA+NON-PROBE: NOT DETECTED
CALCIUM SERPL-MCNC: 8.5 MG/DL (ref 8.8–10.2)
CARBOXYHEMOGLOBIN ARTERIAL: 1.8 % (ref 0–5)
CHLORIDE SERPL-SCNC: 98 MMOL/L (ref 98–111)
CO2 SERPL-SCNC: 22 MMOL/L (ref 22–29)
CREAT SERPL-MCNC: 0.3 MG/DL (ref 0.5–1.2)
FLUAV RNA NPH QL NAA+NON-PROBE: NOT DETECTED
FLUBV RNA NPH QL NAA+NON-PROBE: NOT DETECTED
GLUCOSE BLD-MCNC: 125 MG/DL (ref 70–99)
GLUCOSE SERPL-MCNC: 116 MG/DL (ref 74–109)
HADV DNA NPH QL NAA+NON-PROBE: NOT DETECTED
HCO3 ARTERIAL: 25.3 MMOL/L (ref 22–26)
HCOV 229E RNA NPH QL NAA+NON-PROBE: NOT DETECTED
HCOV HKU1 RNA NPH QL NAA+NON-PROBE: NOT DETECTED
HCOV NL63 RNA NPH QL NAA+NON-PROBE: NOT DETECTED
HCOV OC43 RNA NPH QL NAA+NON-PROBE: NOT DETECTED
HEMOGLOBIN, ART, EXTENDED: 11.5 G/DL (ref 14–18)
HMPV RNA NPH QL NAA+NON-PROBE: NOT DETECTED
HPIV1 RNA NPH QL NAA+NON-PROBE: NOT DETECTED
HPIV2 RNA NPH QL NAA+NON-PROBE: NOT DETECTED
HPIV3 RNA NPH QL NAA+NON-PROBE: NOT DETECTED
HPIV4 RNA NPH QL NAA+NON-PROBE: NOT DETECTED
M PNEUMO DNA NPH QL NAA+NON-PROBE: NOT DETECTED
METHEMOGLOBIN ARTERIAL: 1.1 %
O2 CONTENT ARTERIAL: 15.5 ML/DL
O2 DELIVERY DEVICE: ABNORMAL
O2 SAT, ARTERIAL: 95.4 %
O2 THERAPY: ABNORMAL
OSMOLALITY URINE: 492 MOSM/KG (ref 250–1200)
OXYGEN FLOW: 1.5
PCO2 ARTERIAL: 34 MMHG (ref 35–45)
PERFORMED ON: ABNORMAL
PH ARTERIAL: 7.48 (ref 7.35–7.45)
PO2 ARTERIAL: 86 MMHG (ref 80–100)
POTASSIUM BLD-SCNC: 4 MMOL/L
POTASSIUM SERPL-SCNC: 3.6 MMOL/L (ref 3.5–5)
PROT SERPL-MCNC: 5.7 G/DL (ref 6.6–8.7)
RSV RNA NPH QL NAA+NON-PROBE: NOT DETECTED
RV+EV RNA NPH QL NAA+NON-PROBE: NOT DETECTED
SAMPLE SOURCE: ABNORMAL
SARS-COV-2 RNA NPH QL NAA+NON-PROBE: NOT DETECTED
SODIUM SERPL-SCNC: 132 MMOL/L (ref 136–145)
SODIUM UR-SCNC: 141 MMOL/L

## 2023-12-07 PROCEDURE — 71045 X-RAY EXAM CHEST 1 VIEW: CPT

## 2023-12-07 PROCEDURE — 95714 VEEG EA 12-26 HR UNMNTR: CPT

## 2023-12-07 PROCEDURE — 2500000003 HC RX 250 WO HCPCS: Performed by: INTERNAL MEDICINE

## 2023-12-07 PROCEDURE — 99233 SBSQ HOSP IP/OBS HIGH 50: CPT | Performed by: PSYCHIATRY & NEUROLOGY

## 2023-12-07 PROCEDURE — 5A1955Z RESPIRATORY VENTILATION, GREATER THAN 96 CONSECUTIVE HOURS: ICD-10-PCS | Performed by: INTERNAL MEDICINE

## 2023-12-07 PROCEDURE — 6360000002 HC RX W HCPCS: Performed by: HOSPITALIST

## 2023-12-07 PROCEDURE — 6360000002 HC RX W HCPCS: Performed by: NURSE PRACTITIONER

## 2023-12-07 PROCEDURE — 80053 COMPREHEN METABOLIC PANEL: CPT

## 2023-12-07 PROCEDURE — 6370000000 HC RX 637 (ALT 250 FOR IP): Performed by: INTERNAL MEDICINE

## 2023-12-07 PROCEDURE — 6360000002 HC RX W HCPCS: Performed by: INTERNAL MEDICINE

## 2023-12-07 PROCEDURE — 2700000000 HC OXYGEN THERAPY PER DAY

## 2023-12-07 PROCEDURE — 94760 N-INVAS EAR/PLS OXIMETRY 1: CPT

## 2023-12-07 PROCEDURE — 6360000002 HC RX W HCPCS: Performed by: PSYCHIATRY & NEUROLOGY

## 2023-12-07 PROCEDURE — 99291 CRITICAL CARE FIRST HOUR: CPT | Performed by: NEUROLOGICAL SURGERY

## 2023-12-07 PROCEDURE — 82962 GLUCOSE BLOOD TEST: CPT

## 2023-12-07 PROCEDURE — 2580000003 HC RX 258: Performed by: PSYCHIATRY & NEUROLOGY

## 2023-12-07 PROCEDURE — 36415 COLL VENOUS BLD VENIPUNCTURE: CPT

## 2023-12-07 PROCEDURE — 2580000003 HC RX 258: Performed by: NEUROLOGICAL SURGERY

## 2023-12-07 PROCEDURE — 95720 EEG PHY/QHP EA INCR W/VEEG: CPT | Performed by: PSYCHIATRY & NEUROLOGY

## 2023-12-07 PROCEDURE — 0BH17EZ INSERTION OF ENDOTRACHEAL AIRWAY INTO TRACHEA, VIA NATURAL OR ARTIFICIAL OPENING: ICD-10-PCS | Performed by: INTERNAL MEDICINE

## 2023-12-07 PROCEDURE — 6360000002 HC RX W HCPCS: Performed by: NEUROLOGICAL SURGERY

## 2023-12-07 PROCEDURE — 2000000000 HC ICU R&B

## 2023-12-07 PROCEDURE — 82803 BLOOD GASES ANY COMBINATION: CPT

## 2023-12-07 PROCEDURE — 31500 INSERT EMERGENCY AIRWAY: CPT

## 2023-12-07 PROCEDURE — A4216 STERILE WATER/SALINE, 10 ML: HCPCS | Performed by: NEUROLOGICAL SURGERY

## 2023-12-07 PROCEDURE — 83935 ASSAY OF URINE OSMOLALITY: CPT

## 2023-12-07 PROCEDURE — 36600 WITHDRAWAL OF ARTERIAL BLOOD: CPT

## 2023-12-07 PROCEDURE — 94002 VENT MGMT INPAT INIT DAY: CPT

## 2023-12-07 PROCEDURE — 2580000003 HC RX 258: Performed by: NURSE PRACTITIONER

## 2023-12-07 PROCEDURE — 99233 SBSQ HOSP IP/OBS HIGH 50: CPT | Performed by: PHYSICIAN ASSISTANT

## 2023-12-07 PROCEDURE — 2580000003 HC RX 258: Performed by: INTERNAL MEDICINE

## 2023-12-07 PROCEDURE — 84300 ASSAY OF URINE SODIUM: CPT

## 2023-12-07 PROCEDURE — 0202U NFCT DS 22 TRGT SARS-COV-2: CPT

## 2023-12-07 PROCEDURE — 70450 CT HEAD/BRAIN W/O DYE: CPT

## 2023-12-07 PROCEDURE — C9113 INJ PANTOPRAZOLE SODIUM, VIA: HCPCS | Performed by: NEUROLOGICAL SURGERY

## 2023-12-07 RX ORDER — HYDRALAZINE HYDROCHLORIDE 20 MG/ML
10 INJECTION INTRAMUSCULAR; INTRAVENOUS EVERY 6 HOURS PRN
Status: DISCONTINUED | OUTPATIENT
Start: 2023-12-07 | End: 2023-12-11 | Stop reason: HOSPADM

## 2023-12-07 RX ORDER — FOSPHENYTOIN SODIUM 50 MG/ML
100 INJECTION, SOLUTION INTRAMUSCULAR; INTRAVENOUS EVERY 8 HOURS
Status: DISCONTINUED | OUTPATIENT
Start: 2023-12-08 | End: 2023-12-11 | Stop reason: HOSPADM

## 2023-12-07 RX ORDER — DEXMEDETOMIDINE HYDROCHLORIDE 4 UG/ML
.1-1.5 INJECTION, SOLUTION INTRAVENOUS CONTINUOUS
Status: DISCONTINUED | OUTPATIENT
Start: 2023-12-07 | End: 2023-12-11 | Stop reason: HOSPADM

## 2023-12-07 RX ORDER — GLYCOPYRROLATE 0.2 MG/ML
0.2 INJECTION INTRAMUSCULAR; INTRAVENOUS 3 TIMES DAILY
Status: DISCONTINUED | OUTPATIENT
Start: 2023-12-07 | End: 2023-12-11 | Stop reason: HOSPADM

## 2023-12-07 RX ORDER — CARBOXYMETHYLCELLULOSE SODIUM 10 MG/ML
1 GEL OPHTHALMIC 3 TIMES DAILY
Status: DISCONTINUED | OUTPATIENT
Start: 2023-12-07 | End: 2023-12-11 | Stop reason: HOSPADM

## 2023-12-07 RX ADMIN — THIAMINE HYDROCHLORIDE 100 MG: 100 INJECTION, SOLUTION INTRAMUSCULAR; INTRAVENOUS at 07:48

## 2023-12-07 RX ADMIN — LEVETIRACETAM 1000 MG: 100 INJECTION, SOLUTION, CONCENTRATE INTRAVENOUS at 22:02

## 2023-12-07 RX ADMIN — LORAZEPAM 2 MG: 2 INJECTION INTRAMUSCULAR at 12:13

## 2023-12-07 RX ADMIN — GLYCOPYRROLATE 0.2 MG: 0.2 INJECTION INTRAMUSCULAR; INTRAVENOUS at 19:30

## 2023-12-07 RX ADMIN — LORAZEPAM 2 MG: 2 INJECTION INTRAMUSCULAR at 21:28

## 2023-12-07 RX ADMIN — LEVETIRACETAM 1000 MG: 100 INJECTION, SOLUTION, CONCENTRATE INTRAVENOUS at 10:32

## 2023-12-07 RX ADMIN — SODIUM CHLORIDE, PRESERVATIVE FREE 40 MG: 5 INJECTION INTRAVENOUS at 07:48

## 2023-12-07 RX ADMIN — SODIUM CHLORIDE 5 MG/HR: 9 INJECTION, SOLUTION INTRAVENOUS at 06:32

## 2023-12-07 RX ADMIN — WATER 2000 MG: 1 INJECTION INTRAMUSCULAR; INTRAVENOUS; SUBCUTANEOUS at 04:13

## 2023-12-07 RX ADMIN — WATER 2000 MG: 1 INJECTION INTRAMUSCULAR; INTRAVENOUS; SUBCUTANEOUS at 12:13

## 2023-12-07 RX ADMIN — CARBOXYMETHYLCELLULOSE SODIUM 1 DROP: 10 GEL OPHTHALMIC at 19:30

## 2023-12-07 RX ADMIN — CARBOXYMETHYLCELLULOSE SODIUM 1 DROP: 10 GEL OPHTHALMIC at 14:42

## 2023-12-07 RX ADMIN — GLYCOPYRROLATE 0.2 MG: 0.2 INJECTION INTRAMUSCULAR; INTRAVENOUS at 14:42

## 2023-12-07 RX ADMIN — SODIUM CHLORIDE 1200 MG PE: 9 INJECTION, SOLUTION INTRAVENOUS at 16:14

## 2023-12-07 RX ADMIN — FOLIC ACID 1 MG: 1 TABLET ORAL at 14:42

## 2023-12-07 RX ADMIN — THERA TABS 1 TABLET: TAB at 14:42

## 2023-12-07 RX ADMIN — GLYCOPYRROLATE 0.2 MG: 0.2 INJECTION INTRAMUSCULAR; INTRAVENOUS at 10:32

## 2023-12-07 RX ADMIN — WATER 2000 MG: 1 INJECTION INTRAMUSCULAR; INTRAVENOUS; SUBCUTANEOUS at 20:12

## 2023-12-07 RX ADMIN — DEXMEDETOMIDINE HYDROCHLORIDE 0.2 MCG/KG/HR: 400 INJECTION, SOLUTION INTRAVENOUS at 08:20

## 2023-12-07 ASSESSMENT — PULMONARY FUNCTION TESTS
PIF_VALUE: 15
PIF_VALUE: 14
PIF_VALUE: 13
PIF_VALUE: 15
PIF_VALUE: 18
PIF_VALUE: 15
PIF_VALUE: 14
PIF_VALUE: 16
PIF_VALUE: 16
PIF_VALUE: 0
PIF_VALUE: 18
PIF_VALUE: 17
PIF_VALUE: 14

## 2023-12-07 ASSESSMENT — PAIN SCALES - GENERAL: PAINLEVEL_OUTOF10: 0

## 2023-12-07 NOTE — PROGRESS NOTES
Patient family called and they updated on patient condition. Explained about his airway and secretions becoming an issue. They had a meeting to decide they want to give him a chance on ventilator. Dr. Mahin Finn been here and updated on the situation.

## 2023-12-07 NOTE — PROGRESS NOTES
Physician Progress Note      Jos Lu  CSN #:                  [id number]  :                       1940  ADMIT DATE:       2023 6:44 PM  [facility] DATE:   Kenneth  PROVIDER #:        Yokasta Samuels DO          QUERY TEXT:    Patient admitted with Skull fracture and intracranial hemorrhages. Patient   noted to be unresponsive at the time of admission. Also noted to not follow   commands or speak. Please document in progress notes and discharge summary if   you are treating and/or evaluating any of the following: The medical record reflects the following:  Risk Factors: Skull Fracture and Intracranial Hemorrhages post fall  Clinical Indicators: Pt. had an unwitnessed fall and was noted by the ER   provider that the patient was unresponsive with EMS. Pt. is noted to not be   verbal or follow commands. Consult note dated 23 noted Rose Fall is currently   easily aroused but nonverbal with a GCS of 10, his AMS is likely a result of   his significant concussive injury and intracranial hemorrhage. \"  Treatment: Head CT, Neuro Checks, EEG    Diza Muñoz RN-BSN, [facility]  Clinical   O. [phone]. [email or web address]  [employer]  Options provided:  -- Coma due to skull fracture and intracranial hemorrhages  -- Somnolence due to skull fracture and intracranial hemorrhages: Pt is   somnolent due to skull fracture and intracranial hemorrhages. -- Other - I will add my own diagnosis  -- Disagree - Not applicable / Not valid  -- Disagree - Clinically unable to determine / Unknown  -- Refer to Clinical Documentation Reviewer    PROVIDER RESPONSE TEXT:    Pt is in a coma due to skull fracture and intracranial hemorrhages.     Query created by: Paddy Broderick on 2023 10:44 AM      Electronically signed by:  Yokasta Samuels DO 2023 3:15 PM

## 2023-12-07 NOTE — PROGRESS NOTES
Respiratory bedside to intubate. Family aware. 20mg Etomidate IV, 10mg Vecc IV, Bag Valve mask to spO2 100%, patient intubated with 7.5 tube 22 at the teeth, good color change on CO2 detector, breath sounds bilaterally. Place on vent 100% O2, PEEP 5.0, , rate of 16. CXR ordered.     Electronically signed by Tesha Vo RN on 12/7/2023 at 8:12 AM

## 2023-12-07 NOTE — ACP (ADVANCE CARE PLANNING)
Advance Care Planning     Advance Care Planning (ACP) Physician/NP/PA (Provider) Brenden Ames      Date of ACP Conversation: 12/07/2023    Conversation Conducted with:    Healthcare Decision Maker: Named in Advance Directive or Healthcare Power of  (name) [address] [place] [address] :    Current Designated Health Care Decision Maker:    Primary Decision Maker: [facility] - [phone]    Secondary Decision Maker: Carie Gill [facility] - [phone]    Care Preferences:    Resuscitation:  No    Length of Voluntary ACP Conversation in minutes:  25 minutes included w/ total consult time     Alfred Freeman PA-C

## 2023-12-07 NOTE — PLAN OF CARE
Nutrition Problem #1: Inadequate oral intake  Intervention: Food and/or Nutrient Delivery: Continue NPO, Start Tube Feeding  Nutritional

## 2023-12-07 NOTE — PROGRESS NOTES
Comprehensive Nutrition Assessment    Type and Reason for Visit:  Initial    Nutrition Recommendations/Plan:   When access placed, start Promote with fiber at 15ml/hr and advance by 5ml every 6 hours until goal rate of 68ml is reached. Malnutrition Assessment:  Malnutrition Status:  Severe malnutrition (12/07/23 1036)    Context:  Acute Illness     Findings of the 6 clinical characteristics of malnutrition:  Energy Intake:  Mild decrease in energy intake (Comment)  Weight Loss:  Unable to assess     Body Fat Loss: Moderate body fat loss Orbital, Buccal region   Muscle Mass Loss: Moderate muscle mass loss Temples (temporalis), Clavicles (pectoralis & deltoids)  Fluid Accumulation:  No significant fluid accumulation     Strength:  Not Performed    Nutrition Assessment:    Following patient for vent status and low BMI = 18. At present time pt is NPO. Aware tube feeding access is not available at this time. Nursing to try to place feeding tube laer this a.m. Pt is NPO x 3 days. Nutrition Related Findings:    Na+ 134. Accuchek's 125 Wound Type: None (abrasion)       Current Nutrition Intake & Therapies:    Average Meal Intake: NPO  Average Supplements Intake: NPO  Current Tube Feeding (TF) Orders:  Feeding Route: Orogastric  Formula: Other Tube Feeding (Comment)  Schedule: Continuous  Feeding Regimen: Promote with fiber goal rate 68ml/hr  Additives/Modulars: None  Water Flushes: no flush  Current TF & Flush Orders Provides: 0  Goal TF & Flush Orders Provides: Promote with fiber @ 68ml/hr = 1632 kcals with 102g protein, 225g CHO and 1356ml free water from formula    Anthropometric Measures:  Height: 182.9 cm (6' 0.01\")  Ideal Body Weight (IBW): 178 lbs (81 kg)    Admission Body Weight: 74.8 kg (165 lb) (stated)  Current Body Weight: 60.1 kg (132 lb 9.6 oz), 74.5 % IBW.  Weight Source: Bed Scale  Current BMI (kg/m2): 18  BMI Categories: Underweight (BMI less than 22) age over 72    Estimated Daily Nutrient

## 2023-12-07 NOTE — PROGRESS NOTES
multilevel bilateral neural foraminal narrowing secondary to uncovertebral and facet hypertrophy. Osteopenia. Impression: 1. No acute osseous abnormality of the cervical spine. 2.  Severe degenerative changes  All CT scans are performed using dose optimization techniques as appropriate to the performed exam and include at least one of the following: Automated exposure control, adjustment of the mA and/or kV according to size, and the use of iterative reconstruction technique. ______________________________________ Electronically signed by: Alex Cardoza M.D. Date:     12/04/2023 Time:    19:24     XR PELVIS (1-2 VIEWS)    Result Date: 12/4/2023  EXAM:  SINGLE VIEW OF THE PELVIS. History:  Pelvic trauma. FINDINGS:  No acute fracture or dislocation. Grossly intact left hip arthroplasty hardware. Atherosclerotic vascular calcifications. Mild to moderate narrowing of the right hip joint. Impression:  No acute osseous abnormality   ______________________________________ Electronically signed by: Alex Cardoza M.D. Date:     12/04/2023 Time:    19:30       Lab Results   Component Value Date    WBC 8.4 12/06/2023    HGB 10.4 (L) 12/06/2023    HCT 30.5 (L) 12/06/2023    MCV 91.3 12/06/2023     12/06/2023     Lab Results   Component Value Date     (L) 12/07/2023    K 3.6 12/07/2023    CL 98 12/07/2023    CO2 22 12/07/2023    BUN 10 12/07/2023    CREATININE 0.3 (L) 12/07/2023    GLUCOSE 116 (H) 12/07/2023    CALCIUM 8.5 (L) 12/07/2023    PROT 5.7 (L) 12/07/2023    LABALBU 3.4 (L) 12/07/2023    BILITOT 1.0 12/07/2023    ALKPHOS 46 12/07/2023    AST 24 12/07/2023    ALT 9 12/07/2023    LABGLOM >60 12/07/2023     Lab Results   Component Value Date    INR 1.09 12/04/2023    PROTIME 13.8 12/04/2023       RECORD REVIEW:   Previous medical records, medications were reviewed at today's visit. Nursing/physician notes, imaging, labs and vitals reviewed.    PT,OT and/or speech notes reviewed   ASSESSMENT:  83 y.o. admitted with TBI, traumatic SAH/ICH, seizure, prolonged AMS. Suspect prolonged AMS secondary his brain injury and ICH. cEEG with slowing and brief intermittent periods of semi-rhythmic delta slowing.  Some intermixed GPED's also noted which can be seen in severe TBI. Brief electrographic seizure activity is not completely excluded.  No overt evidence status epilepticus on cEEG. Prior repeat CT stable, neurosurgery following.   Exam worsening.      PLAN:   Neurosurgery following, plans repeat CT in the am     Holding antiplatelet, anticoagulating medications    Continue Keppra at 1000 mg IV q12h, will add fosphenytoin given intermittent episodes of semi-rhythmic delta on cEEG, intermittent brief electrographic seizures not excluded.  cEEG is not consistent with status epilepticus.  Continue cEEG    Ativan prn, seizure precautions.   Follow exam, consider palliative care / hospice if does not improve.      Please feel free to call with any questions.   [phone] (cell phone).      Bernardo Stewart,   Board Certified Neurology

## 2023-12-07 NOTE — CONSULTS
Palliative Care Consult Note    12/7/2023 10:45 AM  Subjective:  Admit Date: 12/4/2023  PCP: No primary care provider on file. Date of Service: 12/7/2023    Reason for Consultation:  Goals of Care, Code Status, Family Support     History Obtained From: EMR/Patient and their Family    History Of Present Illness: The patient is a 80 y.o. male with PMH cardiomyopathy, s/p pacemaker, HTN, HLD, osteoarthritis who presented to [facility] ED on 12/4/2023 after a fall down 4-5 stairs at home that resulted in head trauma to include a laceration to the back of the head. He is visiting from [place] and had been unloading their vehicle and fell at some point during that. CT facial bones reported right frontal and parietal bone fractures, no acute facial fracture or dislocation, left maxillary sinus disease/ethmoid air cell disease. Chest x-ray reported cardiomegaly and atherosclerosis with pacemaker known. There was also a right clavicular fracture of indeterminate age seen. Pelvic films reported no acute osseous abnormality. CT cervical spine reported no acute osseous abnormality though severe degenerative changes seen. CT head reported large superior full-thickness skull fracture with multiple intracranial hemorrhages. The patient was noted to have a seizure in the ED and was placed on Keppra and given Ativan. He was admitted to hospitalist service with intracranial hemorrhage and skull fracture. He has been followed by neurosurgery and there is no surgical indication at the time of admission. Neurology is following as well and Sharon Maxim has been continued. Over the last few days the patient has been less alert and has developed a left upward gaze deviation as well as nonrhythmic facial twitching. Keppra increased. On 12/7/2023 the patient is developing worsening secretions and he is hypoxic. His oxygen demand is increasing. Palliative care has been consulted for goals of care.     Past Medical History: process education, pt/family support                                     Total Time Spent with patient assessment, interview of independent historian/HCS, workup/treatment review, discussion with medical team, review of current and home medications, review of care everywhere and placement of orders/preparation of this note and ACP note: 80 minutes    Electronically signed by Lisandra Reyes PA-C on 12/7/2023 at 10:45 AM    (Please note that portions of this note were completed with a voice recognition program.  Efforts were made to edit the dictations but occasionally words are mis-transcribed.)Palliative Care Consult Note

## 2023-12-07 NOTE — PROCEDURES
DAILY ADULT INPATIENT VIDEO-EEG EVENT MONITORING REPORT    Patient:   To Jordan  MR#:    [id number]  Room #:    INPATIENT   YOB: 1940  Study Date:    December 6, 2023  Referring Physician:   Bernardo Stewart DO      CLINICAL INFORMATION:     This patient is a 83 y.o. male with a history of AMS, seizure, TBI, ICH.  The specific reason we are doing this study is to exclude subclinical status epilepticus.      MEDICATIONS:     See MAR    RECORDING CONDITIONS:     Continuous video-EEG monitoring was performed with [employer] equiptment. The recorded period occurred on December 6, 2023. Electrodes were applied according to the International 10-20 System. Data were obtained, stored, and interpreted according to ACNS guidelines (J Clin Neurophysiol 2006;23(2):) utilizing referential montage recording, with reformatting to longitudinal, transverse bipolar, and referential montages as necessary for interpretation, along with digital/automated EEG analysis. Physician access and review of the EEG and Video data occurred actively during the recording.      RECORDING DURATION:   12 hours, 42 minutes.       DESCRIPTION OF BASELINE RECORD:  There is diffuse slowing of the background rhythm.  Intermixed brief episodes and arrhythmic delta slowing is noted.  Somewhat suspicious for intermittent brief electrographic seizure activity.  Intermittent generalized periodic discharges were also noted appearing most consistent with [employer]'s.  Significant movement and muscle artifact limited recording at times.      E.E.G. INTERPRETATION:    There is diffuse slowing of background rhythm with occasional intermixed brief episodes of semirhythmic delta slowing somewhat suspicious for electrographic seizure activity.  Intermixed intermittent generalized periodic discharges were also noted likely consistent with [employer]'s.  There is no overt evidence of convulsive or nonconvulsive status epilepticus.      Will continue cEEG monitoring.   Will add fosphenytoin, continue keppra.        Anibal Luna, DO  Board Certified Neurologist

## 2023-12-07 NOTE — PROGRESS NOTES
[facility] Critical Care Note        CHIEF COMPLAINT:  Altered mental status      INTERVAL UPDATE:    Patient seen and examined in ICU. His neurologic exam has declined in the past 24h. He remains completely nonverbal.  His eyes are now closed but he will open them to noxious stimuli. He is not moving his upper extremities but will weakly withdraw his lower extremities to pain. His respiratory status is declining, he does not appear to be handling his secretions, he is tachypneic and having desaturations. Nursing has discussed with family and they have authorized intubation. No obvious seizure activity cEEG thus far per neurology. HPI:    The patient is a 80 y.o. adult who presented to the [facility] ED by EMS after a fall down 4-5 steps and sustaining significant head trauma. He is currently nonverbal and history is obtained from nursing and review of medical records. He is apparently visiting from [place] with his wife and was unloading his car last evening when he sustaining a fall down some stairs. He was found down by his wife and EMS was called. He was reported to be minimally responsive by EMS and was transported to the [facility] ED. In the ED, a CT of his head was obtained revealing a skull fracture and multi-compartment intracranial hemorrhage and I have been asked to provide neurosurgical consultation. After arrival in the ED, he had a witnessed seizure and was treated with Ativan and started on keppra. He has not had any further seizure activity. He had an occipital scalp laceration that was closed in the ED. He was somnolent after the seizure but has recovered somewhat overnight. His head CT was repeated after the seizure and again this AM.  He is  currently awake and regards but is nonverbal.  Any attempts at speech are garbled and nonsensical.  He is moving all extremities spontaneously and symmetrically but is not following any commands.   He is mildly agitated this AM.  He has Date    INR 1.09 12/04/2023    PROTIME 13.8 12/04/2023             IMAGING:    CT head reviewed and compared to prior scans.  Stable multi-focal intracranial hemorrhage including left temporal and right frontal contusions, diffuse traumatic subarachnoid hemorrhage and small bilateral subdural hematomas.  There is layering of blood products in the occipital horns of the lateral ventricles but no hydrocephalus.  There is no midline shift.      ASSESSMENT AND PLAN:    This is a 83 y.o. male who is admitted to the intensive care unit following a fall and head injury in which he sustained a skull fracture and multifocal intracranial hemorrhage.  He had a witnessed seizure after arrival to the hospital.  His neurologic exam remains poor and he is nonverbal and not following commands.  He has declined in the past 24h and is now minimally responsive to noxious stimuli.  His respiratory status appears compromised as well.  Family has authorized intubation.    Neuro:  Continue cEEG monitoring to evaluate for seizure activity, continue keppra.  Repeat CT again in AM  CV:  Maintain SBP < 160 mmHg  Pulmonary: Agree with intubation if consistent with family wishes.  Frequent pulmonary toilet.  GI:  Protonix for GI ppx  /Renal:  No active issues, continue to monitor UOP  Endocrine:  Maintain normoglycimia  FEN: Consider DHT or OGT for nutritional support as it is unlikely he will be able to safely swallow for some time  Other:  His prognosis for neurologic recovery appears to be worsening as his injury evolves.  Will discuss with family, however if they wish to continue aggressive treatment intubation is appropriate.  Unfortunately, there is no surgical intervention that is likely to improve his outcome.  Continue ICU care    I attest that I spend >35 minutes engaged in critical care of this patient.  This includes review of EMR data, imaging, bedside evaluation, patient/family counseling and coordination of care with nursing,

## 2023-12-07 NOTE — PROGRESS NOTES
Patient found to be having seizure like activity upon entering the room. Mouth and eyes having rhythmic movements. EEG was showing some spikes as well. Ativan given per orders. Dr Raquel Bernheim notified.

## 2023-12-08 ENCOUNTER — APPOINTMENT (OUTPATIENT)
Dept: CT IMAGING | Age: 83
DRG: 085 | End: 2023-12-08
Payer: MEDICARE

## 2023-12-08 LAB
ALBUMIN SERPL-MCNC: 2.9 G/DL (ref 3.5–5.2)
ALP SERPL-CCNC: 42 U/L (ref 40–130)
ALT SERPL-CCNC: 5 U/L (ref 5–41)
ANION GAP SERPL CALCULATED.3IONS-SCNC: 10 MMOL/L (ref 7–19)
AST SERPL-CCNC: 16 U/L (ref 5–40)
BASOPHILS # BLD: 0 K/UL (ref 0–0.2)
BASOPHILS NFR BLD: 0.1 % (ref 0–1)
BILIRUB SERPL-MCNC: 0.7 MG/DL (ref 0.2–1.2)
BUN SERPL-MCNC: 16 MG/DL (ref 8–23)
CALCIUM SERPL-MCNC: 8.2 MG/DL (ref 8.8–10.2)
CHLORIDE SERPL-SCNC: 101 MMOL/L (ref 98–111)
CO2 SERPL-SCNC: 24 MMOL/L (ref 22–29)
CREAT SERPL-MCNC: 0.4 MG/DL (ref 0.5–1.2)
EOSINOPHIL # BLD: 0 K/UL (ref 0–0.6)
EOSINOPHIL NFR BLD: 0 % (ref 0–5)
ERYTHROCYTE [DISTWIDTH] IN BLOOD BY AUTOMATED COUNT: 14.1 % (ref 11.5–14.5)
GLUCOSE SERPL-MCNC: 137 MG/DL (ref 74–109)
HCT VFR BLD AUTO: 34.6 % (ref 42–52)
HGB BLD-MCNC: 11.8 G/DL (ref 14–18)
IMM GRANULOCYTES # BLD: 0 K/UL
LYMPHOCYTES # BLD: 0.9 K/UL (ref 1.1–4.5)
LYMPHOCYTES NFR BLD: 8.9 % (ref 20–40)
MAGNESIUM SERPL-MCNC: 2.4 MG/DL (ref 1.6–2.4)
MCH RBC QN AUTO: 31.6 PG (ref 27–31)
MCHC RBC AUTO-ENTMCNC: 34.1 G/DL (ref 33–37)
MCV RBC AUTO: 92.5 FL (ref 80–94)
MONOCYTES # BLD: 1.8 K/UL (ref 0–0.9)
MONOCYTES NFR BLD: 18.2 % (ref 0–10)
NEUTROPHILS # BLD: 7 K/UL (ref 1.5–7.5)
NEUTS SEG NFR BLD: 72.5 % (ref 50–65)
PHENYTOIN SERPL-MCNC: 17.4 UG/ML (ref 10–20)
PHOSPHATE SERPL-MCNC: 2.2 MG/DL (ref 2.5–4.5)
PLATELET # BLD AUTO: 163 K/UL (ref 130–400)
PMV BLD AUTO: 9.4 FL (ref 9.4–12.4)
POTASSIUM SERPL-SCNC: 3.7 MMOL/L (ref 3.5–5)
PROT SERPL-MCNC: 5.1 G/DL (ref 6.6–8.7)
RBC # BLD AUTO: 3.74 M/UL (ref 4.7–6.1)
SODIUM SERPL-SCNC: 135 MMOL/L (ref 136–145)
WBC # BLD AUTO: 9.6 K/UL (ref 4.8–10.8)

## 2023-12-08 PROCEDURE — 83735 ASSAY OF MAGNESIUM: CPT

## 2023-12-08 PROCEDURE — 6360000002 HC RX W HCPCS: Performed by: INTERNAL MEDICINE

## 2023-12-08 PROCEDURE — 85025 COMPLETE CBC W/AUTO DIFF WBC: CPT

## 2023-12-08 PROCEDURE — 2700000000 HC OXYGEN THERAPY PER DAY

## 2023-12-08 PROCEDURE — 94003 VENT MGMT INPAT SUBQ DAY: CPT

## 2023-12-08 PROCEDURE — 2580000003 HC RX 258: Performed by: NEUROLOGICAL SURGERY

## 2023-12-08 PROCEDURE — 70450 CT HEAD/BRAIN W/O DYE: CPT

## 2023-12-08 PROCEDURE — 94760 N-INVAS EAR/PLS OXIMETRY 1: CPT

## 2023-12-08 PROCEDURE — 36415 COLL VENOUS BLD VENIPUNCTURE: CPT

## 2023-12-08 PROCEDURE — 2500000003 HC RX 250 WO HCPCS: Performed by: INTERNAL MEDICINE

## 2023-12-08 PROCEDURE — 99291 CRITICAL CARE FIRST HOUR: CPT | Performed by: NEUROLOGICAL SURGERY

## 2023-12-08 PROCEDURE — 99232 SBSQ HOSP IP/OBS MODERATE 35: CPT | Performed by: PHYSICIAN ASSISTANT

## 2023-12-08 PROCEDURE — 2000000000 HC ICU R&B

## 2023-12-08 PROCEDURE — 99233 SBSQ HOSP IP/OBS HIGH 50: CPT | Performed by: PSYCHIATRY & NEUROLOGY

## 2023-12-08 PROCEDURE — 6360000002 HC RX W HCPCS: Performed by: HOSPITALIST

## 2023-12-08 PROCEDURE — 6360000002 HC RX W HCPCS: Performed by: PSYCHIATRY & NEUROLOGY

## 2023-12-08 PROCEDURE — 80053 COMPREHEN METABOLIC PANEL: CPT

## 2023-12-08 PROCEDURE — 6370000000 HC RX 637 (ALT 250 FOR IP): Performed by: NURSE PRACTITIONER

## 2023-12-08 PROCEDURE — 2500000003 HC RX 250 WO HCPCS

## 2023-12-08 PROCEDURE — 80185 ASSAY OF PHENYTOIN TOTAL: CPT

## 2023-12-08 PROCEDURE — 95714 VEEG EA 12-26 HR UNMNTR: CPT

## 2023-12-08 PROCEDURE — 6360000002 HC RX W HCPCS: Performed by: NEUROLOGICAL SURGERY

## 2023-12-08 PROCEDURE — 6360000002 HC RX W HCPCS: Performed by: NURSE PRACTITIONER

## 2023-12-08 PROCEDURE — C9113 INJ PANTOPRAZOLE SODIUM, VIA: HCPCS | Performed by: NEUROLOGICAL SURGERY

## 2023-12-08 PROCEDURE — 2580000003 HC RX 258: Performed by: NURSE PRACTITIONER

## 2023-12-08 PROCEDURE — 84100 ASSAY OF PHOSPHORUS: CPT

## 2023-12-08 PROCEDURE — 2580000003 HC RX 258: Performed by: INTERNAL MEDICINE

## 2023-12-08 PROCEDURE — 6370000000 HC RX 637 (ALT 250 FOR IP): Performed by: INTERNAL MEDICINE

## 2023-12-08 RX ORDER — 0.9 % SODIUM CHLORIDE 0.9 %
500 INTRAVENOUS SOLUTION INTRAVENOUS ONCE
Status: COMPLETED | OUTPATIENT
Start: 2023-12-08 | End: 2023-12-08

## 2023-12-08 RX ORDER — BUMETANIDE 0.25 MG/ML
INJECTION INTRAMUSCULAR; INTRAVENOUS
Status: COMPLETED
Start: 2023-12-08 | End: 2023-12-08

## 2023-12-08 RX ORDER — BUMETANIDE 0.25 MG/ML
0.5 INJECTION INTRAMUSCULAR; INTRAVENOUS ONCE
Status: COMPLETED | OUTPATIENT
Start: 2023-12-08 | End: 2023-12-08

## 2023-12-08 RX ADMIN — WATER 2000 MG: 1 INJECTION INTRAMUSCULAR; INTRAVENOUS; SUBCUTANEOUS at 19:59

## 2023-12-08 RX ADMIN — POLYETHYLENE GLYCOL 3350 17 G: 17 POWDER, FOR SOLUTION ORAL at 16:42

## 2023-12-08 RX ADMIN — CARBOXYMETHYLCELLULOSE SODIUM 1 DROP: 10 GEL OPHTHALMIC at 14:20

## 2023-12-08 RX ADMIN — FOSPHENYTOIN SODIUM 100 MG PE: 50 INJECTION INTRAMUSCULAR; INTRAVENOUS at 00:07

## 2023-12-08 RX ADMIN — WATER 2000 MG: 1 INJECTION INTRAMUSCULAR; INTRAVENOUS; SUBCUTANEOUS at 04:54

## 2023-12-08 RX ADMIN — THERA TABS 1 TABLET: TAB at 07:27

## 2023-12-08 RX ADMIN — GLYCOPYRROLATE 0.2 MG: 0.2 INJECTION INTRAMUSCULAR; INTRAVENOUS at 14:20

## 2023-12-08 RX ADMIN — FOLIC ACID 1 MG: 1 TABLET ORAL at 07:27

## 2023-12-08 RX ADMIN — BUMETANIDE 0.5 MG: 0.25 INJECTION, SOLUTION INTRAMUSCULAR; INTRAVENOUS at 16:42

## 2023-12-08 RX ADMIN — LEVETIRACETAM 1000 MG: 100 INJECTION, SOLUTION, CONCENTRATE INTRAVENOUS at 22:01

## 2023-12-08 RX ADMIN — LORAZEPAM 2 MG: 2 INJECTION INTRAMUSCULAR at 16:02

## 2023-12-08 RX ADMIN — GLYCOPYRROLATE 0.2 MG: 0.2 INJECTION INTRAMUSCULAR; INTRAVENOUS at 19:59

## 2023-12-08 RX ADMIN — BUMETANIDE 0.5 MG: 0.25 INJECTION INTRAMUSCULAR; INTRAVENOUS at 16:42

## 2023-12-08 RX ADMIN — CARBOXYMETHYLCELLULOSE SODIUM 1 DROP: 10 GEL OPHTHALMIC at 07:27

## 2023-12-08 RX ADMIN — LORAZEPAM 2 MG: 2 INJECTION INTRAMUSCULAR at 22:01

## 2023-12-08 RX ADMIN — LEVETIRACETAM 1000 MG: 100 INJECTION, SOLUTION, CONCENTRATE INTRAVENOUS at 10:42

## 2023-12-08 RX ADMIN — SODIUM PHOSPHATE, MONOBASIC, MONOHYDRATE AND SODIUM PHOSPHATE, DIBASIC, ANHYDROUS 9.9 MMOL: 276; 142 INJECTION, SOLUTION INTRAVENOUS at 15:46

## 2023-12-08 RX ADMIN — FOSPHENYTOIN SODIUM 100 MG PE: 50 INJECTION INTRAMUSCULAR; INTRAVENOUS at 07:26

## 2023-12-08 RX ADMIN — SODIUM CHLORIDE 500 ML: 9 INJECTION, SOLUTION INTRAVENOUS at 14:46

## 2023-12-08 RX ADMIN — FOSPHENYTOIN SODIUM 100 MG PE: 50 INJECTION INTRAMUSCULAR; INTRAVENOUS at 23:50

## 2023-12-08 RX ADMIN — LORAZEPAM 2 MG: 2 INJECTION INTRAMUSCULAR at 04:54

## 2023-12-08 RX ADMIN — FOSPHENYTOIN SODIUM 100 MG PE: 50 INJECTION INTRAMUSCULAR; INTRAVENOUS at 15:45

## 2023-12-08 RX ADMIN — CARBOXYMETHYLCELLULOSE SODIUM 1 DROP: 10 GEL OPHTHALMIC at 19:59

## 2023-12-08 RX ADMIN — DEXMEDETOMIDINE HYDROCHLORIDE 0.4 MCG/KG/HR: 400 INJECTION, SOLUTION INTRAVENOUS at 23:46

## 2023-12-08 RX ADMIN — THIAMINE HYDROCHLORIDE 100 MG: 100 INJECTION, SOLUTION INTRAMUSCULAR; INTRAVENOUS at 07:27

## 2023-12-08 RX ADMIN — WATER 2000 MG: 1 INJECTION INTRAMUSCULAR; INTRAVENOUS; SUBCUTANEOUS at 12:19

## 2023-12-08 RX ADMIN — DEXMEDETOMIDINE HYDROCHLORIDE 0.4 MCG/KG/HR: 400 INJECTION, SOLUTION INTRAVENOUS at 10:58

## 2023-12-08 RX ADMIN — SODIUM CHLORIDE, PRESERVATIVE FREE 40 MG: 5 INJECTION INTRAVENOUS at 07:26

## 2023-12-08 RX ADMIN — GLYCOPYRROLATE 0.2 MG: 0.2 INJECTION INTRAMUSCULAR; INTRAVENOUS at 07:26

## 2023-12-08 ASSESSMENT — PULMONARY FUNCTION TESTS
PIF_VALUE: 15
PIF_VALUE: 19
PIF_VALUE: 16
PIF_VALUE: 15
PIF_VALUE: 22
PIF_VALUE: 15
PIF_VALUE: 17
PIF_VALUE: 15
PIF_VALUE: 14
PIF_VALUE: 14
PIF_VALUE: 15
PIF_VALUE: 16
PIF_VALUE: 15
PIF_VALUE: 16
PIF_VALUE: 16
PIF_VALUE: 11
PIF_VALUE: 15
PIF_VALUE: 15
PIF_VALUE: 14
PIF_VALUE: 15
PIF_VALUE: 15
PIF_VALUE: 54
PIF_VALUE: 15

## 2023-12-08 ASSESSMENT — PAIN SCALES - GENERAL
PAINLEVEL_OUTOF10: 0

## 2023-12-08 NOTE — PLAN OF CARE
Problem: Nutrition Deficit:  Goal: Optimize nutritional status  12/8/2023 1317 by Boby Lester, MS, RD, LD  Outcome: Progressing  Flowsheets (Taken 12/8/2023 1308)  Nutrient intake appropriate for improving, restoring, or maintaining nutritional needs:   Assess nutritional status and recommend course of action   Recommend, monitor, and adjust tube feedings and TPN/PPN based on assessed needs  12/8/2023 0830 by Steve ANNE RN  Outcome: Progressing  12/8/2023 0044 by Kingsley Mullins RN  Outcome: Not Progressing     Problem: Discharge Planning  Goal: Discharge to home or other facility with appropriate resources  12/8/2023 0830 by Vivian Garcia, RN  Outcome: Progressing  Flowsheets (Taken 12/8/2023 0800)  Discharge to home or other facility with appropriate resources: Identify barriers to discharge with patient and caregiver  12/8/2023 0044 by Kingsley Mullins RN  Outcome: Not Progressing     Problem: Pain  Goal: Verbalizes/displays adequate comfort level or baseline comfort level  12/8/2023 0830 by Vivian Garcia, RN  Outcome: Progressing  Flowsheets (Taken 12/8/2023 0800)  Verbalizes/displays adequate comfort level or baseline comfort level: Encourage patient to monitor pain and request assistance  12/8/2023 0044 by Kingsley Mullins RN  Outcome: Not Progressing     Problem: Safety - Adult  Goal: Free from fall injury  12/8/2023 0830 by Vivian Garcia, RN  Outcome: Progressing  Flowsheets (Taken 12/8/2023 0800)  Free From Fall Injury: Instruct family/caregiver on patient safety  12/8/2023 0044 by Kingsley Mullins RN  Outcome: Not Progressing     Problem: Skin/Tissue Integrity  Goal: Absence of new skin breakdown  Description: 1. Monitor for areas of redness and/or skin breakdown  2. Assess vascular access sites hourly  3. Every 4-6 hours minimum:  Change oxygen saturation probe site  4.   Every 4-6 hours:  If on nasal continuous positive airway pressure, respiratory therapy assess nares and determine need for appliance change or resting period.  12/8/2023 0830 by Vivian Douglass, RN  Outcome: Progressing  12/8/2023 0044 by Eben Salguero, RN  Outcome: Not Progressing     Problem: Nutrition Deficit:  Goal: Optimize nutritional status  12/8/2023 1317 by Brit Armijo, MS, RD, LD  Outcome: Progressing  Flowsheets (Taken 12/8/2023 1308)  Nutrient intake appropriate for improving, restoring, or maintaining nutritional needs:   Assess nutritional status and recommend course of action   Recommend, monitor, and adjust tube feedings and TPN/PPN based on assessed needs  12/8/2023 0830 by Vivian Douglass, RN  Outcome: Progressing  12/8/2023 0044 by Eben Salguero, RN  Outcome: Not Progressing

## 2023-12-08 NOTE — PROGRESS NOTES
Hospitalist Progress Note    Patient:  To Jordan  YOB: 1940  Date of Service: 12/8/2023  MRN: [id number]   Acct: [id number]   Primary Care Physician: No primary care provider on file.  Advance Directive: DNR  Admit Date: 12/4/2023       Hospital Day: 4  Referring Provider: Sage Ulloa DO    Patient Seen, Chart, Consults, Notes, Labs, Radiology studies reviewed.    Subjective:  To Jordan is a 83 y.o. male  whom we are following for status post fall, multiple intracranial hemorrhages, skull fracture, posttraumatic seizure, concussion.  He required intubation yesterday morning because of secretions.  There is concerned that he may be having some possible seizure activity.  He had had some large spikes on his continuous EEG.  Neurologically his exam is unchanged.  He is on assist-control rate of 16, tidal volume of 450, 5 of PEEP, FiO2 of 0.3.    Allergies:  Patient has no known allergies.    Medicines:  Current Facility-Administered Medications   Medication Dose Route Frequency Provider Last Rate Last Admin    hydrALAZINE (APRESOLINE) injection 10 mg  10 mg IntraVENous Q6H PRN Sage Ulloa DO        niCARdipine (CARDENE) 25 mg in sodium chloride 0.9 % 250 mL infusion (Hsmp2Cvi)  2.5-15 mg/hr IntraVENous Continuous Sage Ulloa DO   Stopped at 12/07/23 1232    dexmedetomidine (PRECEDEX) 400 mcg in sodium chloride 0.9 % 100 mL infusion  0.1-1.5 mcg/kg/hr IntraVENous Continuous Sage Ulloa DO 6 mL/hr at 12/08/23 0912 0.4 mcg/kg/hr at 12/08/23 0912    Glycopyrrolate injection 0.2 mg  0.2 mg IntraVENous TID Sage Ulloa DO   0.2 mg at 12/08/23 0726    carboxymethylcellulose (THERATEARS) 1 % ophthalmic gel 1 drop  1 drop Both Eyes TID Sage Ulloa DO   1 drop at 12/08/23 0727    fosphenytoin (CEREBYX) injection 100 mg PE  100 mg PE IntraVENous Q8H Bernardo Stewart DO   100 mg PE at 12/08/23 0726    pantoprazole (PROTONIX) 40 mg in sodium chloride  performed exam and includes at least one of the following:  Automated exposure control, adjustment of the mA and/or kV according to size, and the use of iterative reconstruction technique. FINDINGS:  A fracture courses through the skull from the right frontal bone region to the right posterior parietal bone. This is oriented along the sagittal suture and is minimally displaced. The fracture is incompletely imaged and as better appreciated on the CT head. No other fractures are evident. There is mucous membrane thickening within the left maxillary sinus and scattered ethmoid air cells. The other paranasal sinuses and mastoid air cells are clear. The mandibular condyles are seated in the glenoid fossa. The orbital walls are intact. - Right frontal and parietal bone fractures. - No acute facial fracture or dislocation. - Left maxillary sinus disease/ethmoid air cell disease. .  All CT scans are performed using dose optimization techniques as appropriate to the performed exam and include at least one of the following: Automated exposure control, adjustment of the mA and/or kV according to size, and the use of iterative reconstruction technique. ______________________________________ Electronically signed by: Yosi Medrano D.O. Date:     12/04/2023 Time:    20:19     XR CHEST PORTABLE    Result Date: 12/4/2023  EXAM:  FRONTAL CHEST  HISTORY:  Fall - - - - -    Cardiomegaly and atherosclerosis. Pacemaker unit. No visible pneumothorax or pleural fluid. No consolidations. Right clavicular fracture of indeterminate age.   ______________________________________ Electronically signed by: Radu Arroyo M.D. Date:     12/04/2023 Time:    19:36     CT HEAD WO CONTRAST    Result Date: 12/4/2023  EXAM:  CT BRAIN  HISTORY:  Head injury  TECHNIQUE:  CT brain without intravenous contrast.  5-mm axial sections with Reformations.  COMPARISON:  None  FINDINGS: There is a long full-thickness acute moderately

## 2023-12-08 NOTE — PROGRESS NOTES
Patient been chewing on tube and moving right arm some with stimulation. Precedex increased to keep him from chewing on tube.

## 2023-12-08 NOTE — PROGRESS NOTES
Nurse at bedside vent alarming. EEG showed large spikes. Patient had rhythmic eye movements. Ativan given per prn order.

## 2023-12-08 NOTE — PROGRESS NOTES
Palliative Care Progress Note  12/8/2023 11:35 AM    Patient:  To Jordan  YOB: 1940  Primary Care Physician: No primary care provider on file.  Advance Directive: DNR  Admit Date: 12/4/2023       Hospital Day: 4  Portions of this note have been copied forward, however, changed to reflect the most current clinical status of this patient.    CHIEF COMPLAINT/REASON FOR CONSULTATION Goals of care, family support, Code status, symptom management     SUBJECTIVE:  Mr. Jordan remains minimally responsive on very low dose Precedex     HPI:  The patient is a 83 y.o. male with PMH cardiomyopathy, s/p pacemaker, HTN, HLD, osteoarthritis who presented to [facility] ED on 12/4/2023 after a fall down 4-5 stairs at home that resulted in head trauma to include a laceration to the back of the head.  He is visiting from [place] and had been unloading their vehicle and fell at some point during that.  CT facial bones reported right frontal and parietal bone fractures, no acute facial fracture or dislocation, left maxillary sinus disease/ethmoid air cell disease.  Chest x-ray reported cardiomegaly and atherosclerosis with pacemaker known.  There was also a right clavicular fracture of indeterminate age seen.  Pelvic films reported no acute osseous abnormality.  CT cervical spine reported no acute osseous abnormality though severe degenerative changes seen.  CT head reported large superior full-thickness skull fracture with multiple intracranial hemorrhages.  The patient was noted to have a seizure in the ED and was placed on Keppra and given Ativan.  He was admitted to hospitalist service with intracranial hemorrhage and skull fracture.  He has been followed by neurosurgery and there is no surgical indication at the time of admission.  Neurology is following as well and Keppra has been continued.  Over the last few days the patient has been less alert and has developed a left upward gaze deviation as well as nonrhythmic  disabled, unable to carry out self-care and confined to bed or chair    CLINICAL PAIN ASSESSMENT:     No non-verbal indicators of pain noted at time of assessment     Assessment/Plan   Principal Problem:    Intracranial hemorrhage ([address])  Active Problems:    Head injury    Palliative care patient    Severe malnutrition ([address])    Cardiomyopathy ([address])    Hyperlipidemia    Hypertension    Osteoarthritis    Pacemaker    Acute respiratory failure with hypoxia ([address])  Resolved Problems:    * No resolved hospital problems. *      Visit Summary:  Chart reviewed, patient discussed with consulting service and nursing staff. Reviewed health issues, work up and treatment plan as well as factors that lead to hospitalization. The patient remains minimally responsive. He has had very low-dose Precedex but is otherwise not required sedation. There is some questionable right arm movement yesterday afternoon but did not see any spontaneous movement on today's exam.  I met with his spouse and son-in-law only reviewed clinical concerns as well as prognosis. They do want to continue current level of support and will make further decisions based on improvement or decline. They will also have to take into account that their granddaughter is graduating from college tomorrow. I suspect that if they decide to withdraw care they carefully plan that around other events. Opportunity for questions and emotional support provided. Recommendations:   Palliative Care-GOC unchanged, continue current level of support and make further decisions based on patient improvement or decline. The patient's granddaughter is graduating from college Saturday. If they do proceed w/ decision for withdrawal of care it will likely be after those events.   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

## 2023-12-08 NOTE — PLAN OF CARE
Problem: Discharge Planning  Goal: Discharge to home or other facility with appropriate resources  12/8/2023 0830 by Vivian Cerda, RN  Outcome: Progressing  Flowsheets (Taken 12/8/2023 0800)  Discharge to home or other facility with appropriate resources: Identify barriers to discharge with patient and caregiver  12/8/2023 0044 by Davie Santos RN  Outcome: Not Progressing     Problem: Pain  Goal: Verbalizes/displays adequate comfort level or baseline comfort level  12/8/2023 0830 by Vivian Cerda, RN  Outcome: Progressing  Flowsheets (Taken 12/8/2023 0800)  Verbalizes/displays adequate comfort level or baseline comfort level: Encourage patient to monitor pain and request assistance  12/8/2023 0044 by Davie Santos RN  Outcome: Not Progressing     Problem: Safety - Adult  Goal: Free from fall injury  12/8/2023 0830 by Vivian Cerda, RN  Outcome: Progressing  Flowsheets (Taken 12/8/2023 0800)  Free From Fall Injury: Instruct family/caregiver on patient safety  12/8/2023 0044 by Davie Santos RN  Outcome: Not Progressing     Problem: Skin/Tissue Integrity  Goal: Absence of new skin breakdown  Description: 1. Monitor for areas of redness and/or skin breakdown  2. Assess vascular access sites hourly  3. Every 4-6 hours minimum:  Change oxygen saturation probe site  4. Every 4-6 hours:  If on nasal continuous positive airway pressure, respiratory therapy assess nares and determine need for appliance change or resting period.   12/8/2023 0830 by Matteo ANNE RN  Outcome: Progressing  12/8/2023 0044 by Davie Santos RN  Outcome: Not Progressing     Problem: Nutrition Deficit:  Goal: Optimize nutritional status  12/8/2023 0830 by Daron Councilman, RN  Outcome: Progressing  12/8/2023 0044 by Davie Santos RN  Outcome: Not Progressing     Problem: Discharge Planning  Goal: Discharge to home or other facility with appropriate resources  12/8/2023 0830 by Matteo ANNE RN  Outcome:

## 2023-12-08 NOTE — CARE COORDINATION
[place] from [place] of [place]. Here for granddaughter graduation. Normally home with spouse. Following case.   Electronically signed by Hunter Summers RN on 12/8/2023 at 11:56 AM

## 2023-12-08 NOTE — PROGRESS NOTES
[facility] Neurology Progress Note      Patient:   To Jordan  MR#:    [id number]   Room:    0148/148-01   YOB: 1940  Date of Progress Note: 12/8/2023  Time of Note                           1:24 PM  Consulting Physician:  Bernardo Stewart DO  Attending Physician:  Sage Ulloa DO      INTERVAL HISTORY:  Patient remains intubated, exam unchanged, no seizures overnight, CT stable.     REVIEW OF SYSTEMS:  Unable to obtain     PHYSICAL EXAM:    Constitutional -   BP (!) 100/57   Pulse 72   Temp 98.6 °F (37 °C) (Temporal)   Resp 16   Ht 1.829 m (6' 0.01\")   Wt 61.9 kg (136 lb 8 oz)   SpO2 97%   BMI 18.51 kg/m²   General appearance: No acute distress   EYES -   Conjunctiva normal  Pupillary exam as below, see CN exam in the neurologic exam  ENT-    No scars, masses, or lesions over external nose or ears  Oropharynx without erythema, palate midline  Cardiovascular -   No clubbing, cyanosis, or edema   Pulmonary-   Good expansion, normal effort without use of accessory muscles  Musculoskeletal -   No significant wasting of muscles noted  Gait as below, see gait exam in the neurologic exam  Muscle strength, tone, stability as below see the motor exam in the neurologic exam.   No bony deformities  Skin -   Warm, dry, and intact to inspection and palpation.    No rash, erythema, or pallor     NEUROLOGICAL EXAM     Mental status    [] Awake, alert, oriented   [] Affect attention and concentration appear appropriate  [] Recent and remote memory appears unremarkable  [] Speech normal without dysarthria or aphasia, comprehension and repetition intact.   COMMENTS:Non-verbal, does not open eyes to noxious stimulation, not following commands    Cranial Nerves [] No VF deficit to confrontation,  optic discs normal, no papilledema on fundoscopic exam.  [] PERRLA, EOMI, no nystagmus, conjugate eye movements, no ptosis  [] Face symmetric  [] Facial sensation intact  [] Tongue midline no atrophy or  stable. No midline shift and no hydrocephalus. The previously described subdural hemorrhage is minimally increased, especially along the left tentorium. The contusions in the left temporal lobe and right frontal lobe are more noticeable and the post traumatic subarachnoid hemorrhage has slightly increased. Impression:  Slightly increased intracranial hemorrhage. No midline shift and no hydrocephalus. All CT scans are performed using dose optimization techniques as appropriate to the performed exam and include at least one of the following: Automated exposure control, adjustment of the mA and/or kV according to size, and the use of iterative reconstruction technique. ______________________________________ Electronically signed by: More Ballard M.D. Date:     12/05/2023 Time:    00:21     XR ABDOMEN (KUB) (SINGLE AP VIEW)    Result Date: 12/4/2023  EXAM:  ABDOMEN ONE-VIEW  HISTORY:  Vomiting  FINDINGS:  Normal bowel gas pattern. No pathologic calcifications over the kidneys or collecting system. Atherosclerotic vascular calcifications are present. No large free intraperitoneal gas. No abundance of retained colonic stool. No acute findings of the skeleton. Left hip arthroplasty. Normal bowel gas pattern    ______________________________________ Electronically signed by: Mari Delcid M.D. Date:     12/04/2023 Time:    22:39     CT FACIAL BONES WO CONTRAST    Result Date: 12/4/2023  EXAM:  CT MAXILLOFACIAL BONES WITHOUT CONTRAST  HISTORY:  ?  Facial fracture and CT head  COMPARISON:  CT head from 12/04/2023  TECHNIQUE:  Multi-slice transaxial images are acquired through the maxillofacial bones with 2-D reconstructed images.   All CT scans are performed using dose optimization techniques as appropriate to the performed exam and includes at least one of the following:  Automated exposure control, adjustment of the mA and/or kV according to size, and the use of iterative reconstruction

## 2023-12-08 NOTE — PROGRESS NOTES
Comprehensive Nutrition Assessment    Type and Reason for Visit:  Reassess    Nutrition Recommendations/Plan:   Continue to work toward goal rate of tf     Malnutrition Assessment:  Malnutrition Status:  Severe malnutrition (12/08/23 1314)    Context:  Acute Illness     Findings of the 6 clinical characteristics of malnutrition:  Energy Intake:  Mild decrease in energy intake (Comment)  Weight Loss:  Unable to assess     Body Fat Loss: Moderate body fat loss Orbital, Buccal region   Muscle Mass Loss: Moderate muscle mass loss Temples (temporalis), Clavicles (pectoralis & deltoids)  Fluid Accumulation:  No significant fluid accumulation     Strength:  Not Performed    Nutrition Assessment:    TF has been started. Is receiving Promote with fiber currently at 20ml/hr and working toward goal rate of 68ml/hr. Tolerating feeding with residuals of 0-40ml/  Weight has increased slightly. Nutrition Related Findings:    Na+ 135 Wound Type: None       Current Nutrition Intake & Therapies:    Average Meal Intake: NPO  Average Supplements Intake: NPO  Current Tube Feeding (TF) Orders:  Feeding Route: Orogastric  Formula: Other Tube Feeding (Comment)  Schedule: Continuous  Feeding Regimen: Promote with fiber goal rate 68ml/hr  Additives/Modulars: None  Water Flushes: no flush  Current TF & Flush Orders Provides: Promote with fiber 20ml/hr = 480 kcals with 30g protein, 62g CHO and 402ml free water from formula. Goal TF & Flush Orders Provides: Promote with fiber @ 68ml/hr = 1632 kcals with 102g protein, 225g CHO and 1356ml free water from formula    Anthropometric Measures:  Height: 182.9 cm (6' 0.01\")  Ideal Body Weight (IBW): 178 lbs (81 kg)    Admission Body Weight: 74.8 kg (165 lb) (stated)  Current Body Weight: 61.9 kg (136 lb 7.4 oz), 74.5 % IBW.  Weight Source: Bed Scale  Current BMI (kg/m2): 18.5  BMI Categories: Underweight (BMI less than 22) age over 72    Estimated Daily Nutrient Needs:  Energy Requirements

## 2023-12-08 NOTE — PLAN OF CARE
Problem: Discharge Planning  Goal: Discharge to home or other facility with appropriate resources  Outcome: Not Progressing     Problem: Pain  Goal: Verbalizes/displays adequate comfort level or baseline comfort level  Outcome: Not Progressing     Problem: Safety - Adult  Goal: Free from fall injury  Outcome: Not Progressing     Problem: Skin/Tissue Integrity  Goal: Absence of new skin breakdown  Description: 1. Monitor for areas of redness and/or skin breakdown  2. Assess vascular access sites hourly  3. Every 4-6 hours minimum:  Change oxygen saturation probe site  4. Every 4-6 hours:  If on nasal continuous positive airway pressure, respiratory therapy assess nares and determine need for appliance change or resting period.   Outcome: Not Progressing     Problem: Nutrition Deficit:  Goal: Optimize nutritional status  Outcome: Not Progressing

## 2023-12-09 PROBLEM — G40.919 INTRACTABLE SEIZURES (HCC): Status: ACTIVE | Noted: 2023-12-09

## 2023-12-09 LAB — PHENYTOIN SERPL-MCNC: 16.7 UG/ML (ref 10–20)

## 2023-12-09 PROCEDURE — 2580000003 HC RX 258: Performed by: NEUROLOGICAL SURGERY

## 2023-12-09 PROCEDURE — 2580000003 HC RX 258: Performed by: INTERNAL MEDICINE

## 2023-12-09 PROCEDURE — 2000000000 HC ICU R&B

## 2023-12-09 PROCEDURE — 80185 ASSAY OF PHENYTOIN TOTAL: CPT

## 2023-12-09 PROCEDURE — 2500000003 HC RX 250 WO HCPCS: Performed by: INTERNAL MEDICINE

## 2023-12-09 PROCEDURE — 95720 EEG PHY/QHP EA INCR W/VEEG: CPT | Performed by: PSYCHIATRY & NEUROLOGY

## 2023-12-09 PROCEDURE — 2700000000 HC OXYGEN THERAPY PER DAY

## 2023-12-09 PROCEDURE — 99291 CRITICAL CARE FIRST HOUR: CPT | Performed by: NEUROLOGICAL SURGERY

## 2023-12-09 PROCEDURE — 95714 VEEG EA 12-26 HR UNMNTR: CPT

## 2023-12-09 PROCEDURE — 94760 N-INVAS EAR/PLS OXIMETRY 1: CPT

## 2023-12-09 PROCEDURE — 94003 VENT MGMT INPAT SUBQ DAY: CPT

## 2023-12-09 PROCEDURE — 6360000002 HC RX W HCPCS: Performed by: NURSE PRACTITIONER

## 2023-12-09 PROCEDURE — C9113 INJ PANTOPRAZOLE SODIUM, VIA: HCPCS | Performed by: NEUROLOGICAL SURGERY

## 2023-12-09 PROCEDURE — 2580000003 HC RX 258: Performed by: NURSE PRACTITIONER

## 2023-12-09 PROCEDURE — 36415 COLL VENOUS BLD VENIPUNCTURE: CPT

## 2023-12-09 PROCEDURE — 6360000002 HC RX W HCPCS: Performed by: INTERNAL MEDICINE

## 2023-12-09 PROCEDURE — 6360000002 HC RX W HCPCS: Performed by: PSYCHIATRY & NEUROLOGY

## 2023-12-09 PROCEDURE — 6360000002 HC RX W HCPCS: Performed by: NEUROLOGICAL SURGERY

## 2023-12-09 PROCEDURE — 6370000000 HC RX 637 (ALT 250 FOR IP): Performed by: INTERNAL MEDICINE

## 2023-12-09 PROCEDURE — 99232 SBSQ HOSP IP/OBS MODERATE 35: CPT | Performed by: PSYCHIATRY & NEUROLOGY

## 2023-12-09 RX ADMIN — THIAMINE HYDROCHLORIDE 100 MG: 100 INJECTION, SOLUTION INTRAMUSCULAR; INTRAVENOUS at 07:46

## 2023-12-09 RX ADMIN — CARBOXYMETHYLCELLULOSE SODIUM 1 DROP: 10 GEL OPHTHALMIC at 19:20

## 2023-12-09 RX ADMIN — GLYCOPYRROLATE 0.2 MG: 0.2 INJECTION INTRAMUSCULAR; INTRAVENOUS at 07:47

## 2023-12-09 RX ADMIN — SODIUM CHLORIDE: 9 INJECTION, SOLUTION INTRAVENOUS at 17:10

## 2023-12-09 RX ADMIN — GLYCOPYRROLATE 0.2 MG: 0.2 INJECTION INTRAMUSCULAR; INTRAVENOUS at 13:26

## 2023-12-09 RX ADMIN — DEXMEDETOMIDINE HYDROCHLORIDE 0.8 MCG/KG/HR: 400 INJECTION, SOLUTION INTRAVENOUS at 19:17

## 2023-12-09 RX ADMIN — DEXMEDETOMIDINE HYDROCHLORIDE 0.8 MCG/KG/HR: 400 INJECTION, SOLUTION INTRAVENOUS at 10:50

## 2023-12-09 RX ADMIN — FOSPHENYTOIN SODIUM 100 MG PE: 50 INJECTION INTRAMUSCULAR; INTRAVENOUS at 07:47

## 2023-12-09 RX ADMIN — WATER 2000 MG: 1 INJECTION INTRAMUSCULAR; INTRAVENOUS; SUBCUTANEOUS at 12:02

## 2023-12-09 RX ADMIN — CARBOXYMETHYLCELLULOSE SODIUM 1 DROP: 10 GEL OPHTHALMIC at 13:26

## 2023-12-09 RX ADMIN — WATER 2000 MG: 1 INJECTION INTRAMUSCULAR; INTRAVENOUS; SUBCUTANEOUS at 05:12

## 2023-12-09 RX ADMIN — GLYCOPYRROLATE 0.2 MG: 0.2 INJECTION INTRAMUSCULAR; INTRAVENOUS at 19:20

## 2023-12-09 RX ADMIN — FOSPHENYTOIN SODIUM 100 MG PE: 50 INJECTION INTRAMUSCULAR; INTRAVENOUS at 17:13

## 2023-12-09 RX ADMIN — SODIUM CHLORIDE: 9 INJECTION, SOLUTION INTRAVENOUS at 01:04

## 2023-12-09 RX ADMIN — BUMETANIDE 0.2 MG/HR: 0.25 INJECTION INTRAMUSCULAR; INTRAVENOUS at 19:38

## 2023-12-09 RX ADMIN — SODIUM CHLORIDE, PRESERVATIVE FREE 40 MG: 5 INJECTION INTRAVENOUS at 07:46

## 2023-12-09 RX ADMIN — FOLIC ACID 1 MG: 1 TABLET ORAL at 07:46

## 2023-12-09 RX ADMIN — LEVETIRACETAM 1000 MG: 100 INJECTION, SOLUTION, CONCENTRATE INTRAVENOUS at 12:00

## 2023-12-09 RX ADMIN — CARBOXYMETHYLCELLULOSE SODIUM 1 DROP: 10 GEL OPHTHALMIC at 07:48

## 2023-12-09 RX ADMIN — THERA TABS 1 TABLET: TAB at 07:47

## 2023-12-09 ASSESSMENT — PAIN SCALES - GENERAL
PAINLEVEL_OUTOF10: 0
PAINLEVEL_OUTOF10: 1
PAINLEVEL_OUTOF10: 0

## 2023-12-09 ASSESSMENT — PULMONARY FUNCTION TESTS
PIF_VALUE: 19
PIF_VALUE: 14
PIF_VALUE: 16
PIF_VALUE: 16
PIF_VALUE: 17
PIF_VALUE: 15
PIF_VALUE: 17
PIF_VALUE: 16
PIF_VALUE: 14
PIF_VALUE: 16
PIF_VALUE: 15
PIF_VALUE: 15
PIF_VALUE: 17
PIF_VALUE: 18
PIF_VALUE: 15
PIF_VALUE: 17
PIF_VALUE: 16
PIF_VALUE: 15
PIF_VALUE: 14
PIF_VALUE: 14
PIF_VALUE: 16
PIF_VALUE: 15

## 2023-12-09 NOTE — PLAN OF CARE
Problem: Nutrition Deficit:  Goal: Optimize nutritional status  12/9/2023 1037 by Landy Guerra MS, RD, LD  Outcome: Progressing  Flowsheets (Taken 12/9/2023 1033)  Nutrient intake appropriate for improving, restoring, or maintaining nutritional needs:   Assess nutritional status and recommend course of action   Recommend, monitor, and adjust tube feedings and TPN/PPN based on assessed needs  12/9/2023 0007 by Juan Luis White RN  Outcome: Not Progressing     Problem: Discharge Planning  Goal: Discharge to home or other facility with appropriate resources  12/9/2023 0007 by Juan Luis White RN  Outcome: Not Progressing     Problem: Pain  Goal: Verbalizes/displays adequate comfort level or baseline comfort level  12/9/2023 0007 by Juan Luis White RN  Outcome: Not Progressing     Problem: Safety - Adult  Goal: Free from fall injury  12/9/2023 0007 by Juan Luis White RN  Outcome: Not Progressing     Problem: Skin/Tissue Integrity  Goal: Absence of new skin breakdown  Description: 1. Monitor for areas of redness and/or skin breakdown  2. Assess vascular access sites hourly  3. Every 4-6 hours minimum:  Change oxygen saturation probe site  4. Every 4-6 hours:  If on nasal continuous positive airway pressure, respiratory therapy assess nares and determine need for appliance change or resting period.   12/9/2023 0007 by Juan Luis White RN  Outcome: Not Progressing     Problem: Nutrition Deficit:  Goal: Optimize nutritional status  12/9/2023 1037 by Landy Guerra MS, RD, LD  Outcome: Progressing  Flowsheets (Taken 12/9/2023 1033)  Nutrient intake appropriate for improving, restoring, or maintaining nutritional needs:   Assess nutritional status and recommend course of action   Recommend, monitor, and adjust tube feedings and TPN/PPN based on assessed needs  12/9/2023 0007 by Juan Luis White RN  Outcome: Not Progressing

## 2023-12-09 NOTE — PROGRESS NOTES
Housing Stability: Not on file       Medications:   niCARdipine Stopped (12/07/23 1232)    dexmedetomidine HCl in NaCl 0.8 mcg/kg/hr (12/09/23 1050)    sodium chloride      sodium chloride 125 mL/hr at 12/09/23 0104      glycopyrrolate  0.2 mg IntraVENous TID    carboxymethylcellulose  1 drop Both Eyes TID    fosphenytoin  100 mg PE IntraVENous Q8H    pantoprazole (PROTONIX) 40 mg in sodium chloride (PF) 0.9 % 10 mL injection  40 mg IntraVENous Daily    levETIRAcetam  1,000 mg IntraVENous Q12H    thiamine  100 mg IntraVENous Daily    multivitamin  1 tablet Oral Daily    folic acid  1 mg Oral Daily    vitamin D  50,000 Units Oral Weekly    ceFAZolin  2,000 mg IntraVENous Q8H    LORazepam  1 mg IntraVENous Once       Diagnostic Studies:  Reviewed all labs/diagnositcs since last 24hrs:  Recent Results (from the past 24 hour(s))   Phenytoin Level, Total    Collection Time: 12/09/23  1:33 AM   Result Value Ref Range    Phenytoin Lvl 16.7 10.0 - 20.0 ug/mL     cVEEG shows low slow activity diffusely with periodic parasagittal and bilateral central spikes followed by a triphasiform slow wave.  The potentially epileptiform abnormality is often very faint or terminates for minutes at a time.  These have the appearance of PLEDs but are parasagittal and bilateral simultaneous.    Diet:  ADULT TUBE FEEDING; Orogastric; Other Tube Feeding (specify); Promote with Fiber; Continuous; 15; Yes; 5; Q 6 hours; 68; 0; Other (specify); no flush at this time    Examination:  Vitals: BP (!) 133/54   Pulse 86   Temp 98.8 °F (37.1 °C) (Temporal)   Resp 24   Ht 1.829 m (6' 0.01\")   Wt 61.9 kg (136 lb 8 oz)   SpO2 100%   BMI 18.51 kg/m²    Intake/Output Summary (Last 24 hours) at 12/9/2023 1229  Last data filed at 12/9/2023 1200  Gross per 24 hour   Intake 4437.82 ml   Output 897 ml   Net 3540.82 ml     General appearance: He is an elderly man who is acutely ill, intubated, ventilated, and sedated in the ICU.  He chews on ETT  intermittently. HEENT: bandaged, normocephalic  Lungs: clear to auscultation bilaterally  Heart: regular rate and rhythm, S1, S2 normal, no murmur, click, rub or gallop  Extremities:  cold hands, peripheral edema  Neurologic:  Sedated. Minimal grimace to noxious stimulus. PERRL. Corneal reflexes are present. Oculocephalic reflexes are absent. No facial asymmetry. No limb movements. Assessment:   1. Traumatic brain injury with SAH, SDH, intraparenchymal hemorrhage. Clinically minimally improved today. 2. Intractable seizures,  EEG is improving. 3. Acute respiratory failure with hypoxia      Plan:   1. Continue Keppra and fosphenytoin  2. Continued ventilatory support, attempt to wean vent in near future  3. Hold sedation periodically     Discharge planning: To be determined    I spent 35 total minutes in face to face interaction with patient and coordination of care.    I spent > 50% of the total time discussing the diagnoses, evaluation, test results, treatment options, plans; counseling and advising with the patient and/or family and/or caregiver as well as with the care team.    Electronically signed by Verneice Buerger, M.D.

## 2023-12-09 NOTE — PROGRESS NOTES
[facility] Neurosurgery Critical Care Note        CHIEF COMPLAINT:  Altered mental status      INTERVAL UPDATE:    Patient seen and examined in ICU.  He remains intubated and is intermittently requiring Precedex for sedation.  He remains on cEEG monitoring.  He remains of fosphenytoin and keppra.  Neurology reports decrease in electrographic seizure activity after starting dilantin.  He appears slightly more responsive this AM.      HPI:    The patient is a 83 y.o. adult who presented to the [facility] ED by EMS after a fall down 4-5 steps and sustaining significant head trauma.  He is currently nonverbal and history is obtained from nursing and review of medical records.  He is apparently visiting from [place] with his wife and was unloading his car last evening when he sustaining a fall down some stairs.  He was found down by his wife and EMS was called.  He was reported to be minimally responsive by EMS and was transported to the [facility] ED.  In the ED, a CT of his head was obtained revealing a skull fracture and multi-compartment intracranial hemorrhage and I have been asked to provide neurosurgical consultation.  After arrival in the ED, he had a witnessed seizure and was treated with Ativan and started on keppra.  He has not had any further seizure activity.  He had an occipital scalp laceration that was closed in the ED.  He was somnolent after the seizure but has recovered somewhat overnight.  His head CT was repeated after the seizure and again this AM.  He is  currently awake and regards but is nonverbal.  Any attempts at speech are garbled and nonsensical.  He is moving all extremities spontaneously and symmetrically but is not following any commands.  He is mildly agitated this AM.  He has a history of pacemaker placement and prior medical records suggest that he was taking baby aspiring prior to admission.         PHYSICAL EXAM:    Vitals:    12/09/23 1000   BP: (!) 133/54   Pulse: 86   Resp: 24   Temp:   SpO2: 100%       Constitutional: Ill-appearing. Eyes - conjunctiva normal.  Pupils react to light  Ear, nose,throat -Normal pinna and tragus, No scars, or lesions over external nose or ears, no obvious atrophy of tongue  Neck- symmetric, trachea midline, no jugular vein distension, no thyromegaly  Respiration- chest wall symmetric, normal effort without use of accessory muscles  Musculoskeletal - no significant wasting of muscles noted, no bony deformities, symmetric bulk  Extremities- no clubbing, cyanosis or edema  Skin - warm, dry, and intact. No rash,erythema, or pallor. Psychiatric - unable to assess.        NEUROLOGIC EXAM:    MENTAL STATUS: Intubated, weakly opens eyes to voice and tactile stimuli    CRANIAL NERVES: PERRL, gaze midline, face grossly symmetric      MOTOR/SENSORY:     Right Upper Extremity:    Localizes, equivocally attempts to follow commands    Left Upper Extremity:    Localizes    Right Lower Extremity:    Withdraws, equivocally attempts to follow commands    Left Lower Extremity:    Withdraws, equivocally attempts to follow commands          DATA:      Lab Results   Component Value Date    WBC 9.6 12/08/2023    HGB 11.8 (L) 12/08/2023    HCT 34.6 (L) 12/08/2023    MCV 92.5 12/08/2023     12/08/2023     Lab Results   Component Value Date     (L) 12/08/2023    K 3.7 12/08/2023     12/08/2023    CO2 24 12/08/2023    BUN 16 12/08/2023    CREATININE 0.4 (L) 12/08/2023    GLUCOSE 137 (H) 12/08/2023    CALCIUM 8.2 (L) 12/08/2023    PROT 5.1 (L) 12/08/2023    LABALBU 2.9 (L) 12/08/2023    BILITOT 0.7 12/08/2023    ALKPHOS 42 12/08/2023    AST 16 12/08/2023    ALT 5 12/08/2023    LABGLOM >60 12/08/2023     Lab Results   Component Value Date    INR 1.09 12/04/2023    PROTIME 13.8 12/04/2023             IMAGING:    No new imaging      ASSESSMENT AND PLAN:    This is a 80 y.o. male who is admitted to the intensive care unit following a fall and head injury in which he sustained

## 2023-12-09 NOTE — PLAN OF CARE
Problem: Discharge Planning  Goal: Discharge to home or other facility with appropriate resources  Outcome: Not Progressing     Problem: Pain  Goal: Verbalizes/displays adequate comfort level or baseline comfort level  Outcome: Not Progressing     Problem: Safety - Adult  Goal: Free from fall injury  Outcome: Not Progressing     Problem: Skin/Tissue Integrity  Goal: Absence of new skin breakdown  Description: 1. Monitor for areas of redness and/or skin breakdown  2. Assess vascular access sites hourly  3. Every 4-6 hours minimum:  Change oxygen saturation probe site  4. Every 4-6 hours:  If on nasal continuous positive airway pressure, respiratory therapy assess nares and determine need for appliance change or resting period.   Outcome: Not Progressing     Problem: Nutrition Deficit:  Goal: Optimize nutritional status  12/9/2023 0007 by Davie Santos RN  Outcome: Not Progressing  12/8/2023 1317 by Timur Torres MS, RD, LD  Outcome: Progressing  Flowsheets (Taken 12/8/2023 1308)  Nutrient intake appropriate for improving, restoring, or maintaining nutritional needs:   Assess nutritional status and recommend course of action   Recommend, monitor, and adjust tube feedings and TPN/PPN based on assessed needs

## 2023-12-09 NOTE — PROGRESS NOTES
Comprehensive Nutrition Assessment    Type and Reason for Visit:  Reassess    Nutrition Recommendations/Plan:   Continue to work toward goal rate of tf     Malnutrition Assessment:  Malnutrition Status:  Severe malnutrition (12/09/23 1035)    Context:  Acute Illness     Findings of the 6 clinical characteristics of malnutrition:  Energy Intake:  Mild decrease in energy intake (Comment)  Weight Loss:  Unable to assess     Body Fat Loss: Moderate body fat loss Orbital, Buccal region   Muscle Mass Loss: Moderate muscle mass loss Temples (temporalis)  Fluid Accumulation:  No significant fluid accumulation Extremities   Strength:  Not Performed    Nutrition Assessment:    TF has been advanced to 30ml/hr with residuals 20-40 ml. New weight and labs NA    Nutrition Related Findings:    Na+ 135 Wound Type: None       Current Nutrition Intake & Therapies:    Average Meal Intake: NPO  Average Supplements Intake: NPO  Current Tube Feeding (TF) Orders:  Feeding Route: Orogastric  Formula: Other Tube Feeding (Comment)  Schedule: Continuous  Feeding Regimen: Promote with fiber goal rate 68ml/hr  Additives/Modulars: None  Water Flushes: no flush  Current TF & Flush Orders Provides: Promote with fiber @ 30ml/hr = 720 kcals with 45g protein, 93g CHO and 603ml free water from formula  Goal TF & Flush Orders Provides: Promote with fiber @ 68ml/hr = 1632 kcals with 102g protein, 225g CHO and 1356ml free water from formula    Anthropometric Measures:  Height: 182.9 cm (6' 0.01\")  Ideal Body Weight (IBW): 178 lbs (81 kg)    Admission Body Weight: 74.8 kg (165 lb) (stated)  Current Body Weight: 61.9 kg (136 lb 7.4 oz), 74.5 % IBW.  Weight Source: Bed Scale  Current BMI (kg/m2): 18.5  BMI Categories: Underweight (BMI less than 22) age over 72    Estimated Daily Nutrient Needs:  Energy Requirements Based On: Kcal/kg  Weight Used for Energy Requirements: Current  Energy (kcal/day): 4943-0781 kcals (15-18 kcals/kg)  Weight Used for Protein Requirements: Current  Protein (g/day): 102g  Method Used for Fluid Requirements: 1 ml/kcal  Fluid (ml/day): 9720-0590 ml    Nutrition Diagnosis:   Inadequate oral intake related to acute injury/trauma as evidenced by NPO or clear liquid status due to medical condition, intubation, nutrition support - enteral nutrition, moderate loss of subcutaneous fat, moderate muscle loss, weight loss    Nutrition Interventions:   Food and/or Nutrient Delivery: Continue NPO, Continue Current Tube Feeding  Nutrition Education/Counseling: No recommendation at this time  Coordination of Nutrition Care: Continue to monitor while inpatient  Plan of Care discussed with: pt's family    Goals:  Previous Goal Met: Progressing toward Goal(s)  Goals: Meet at least 75% of estimated needs, Initiate nutrition support, Tolerate nutrition support at goal rate       Nutrition Monitoring and Evaluation:   Behavioral-Environmental Outcomes: None Identified  Food/Nutrient Intake Outcomes: Enteral Nutrition Intake/Tolerance  Physical Signs/Symptoms Outcomes: Biochemical Data, Fluid Status or Edema, Weight, Skin    Discharge Planning:    Too soon to determine     Brit Armijo, MS, RD, LD  Contact: [phone]

## 2023-12-09 NOTE — PROGRESS NOTES
Hospitalist Progress Note    Patient:  Shelia Lu  YOB: 1940  Date of Service: 12/9/2023  MRN: [id number]   Acct: [de-identified]   Primary Care Physician: No primary care provider on file. Advance Directive: DNR  Admit Date: 12/4/2023       Hospital Day: 5  Referring Provider: Nancy Lal DO    Patient Seen, Chart, Consults, Notes, Labs, Radiology studies reviewed. Subjective:  Shelia Lu is a 80 y.o. male  whom we are following for status post fall, multiple intracranial hemorrhages, skull fracture, posttraumatic seizure, concussion. No significant clinical change. We are continuing with supportive care. He is on assist-control rate of 16, tidal volume of 450, 5 of PEEP, FiO2 of 0.3. Allergies:  Patient has no known allergies.     Medicines:  Current Facility-Administered Medications   Medication Dose Route Frequency Provider Last Rate Last Admin    sodium phosphate 9.9 mmol in sodium chloride 0.9 % 250 mL IVPB  0.16 mmol/kg IntraVENous PRN Nancy Lal, DO   Stopped at 12/08/23 1949    Or    sodium phosphate 19.8 mmol in sodium chloride 0.9 % 250 mL IVPB  0.32 mmol/kg IntraVENous PRN Nancy Lal DO        hydrALAZINE (APRESOLINE) injection 10 mg  10 mg IntraVENous Q6H PRN Nancy Lal DO        niCARdipine (CARDENE) 25 mg in sodium chloride 0.9 % 250 mL infusion ([id number])  2.5-15 mg/hr IntraVENous Continuous Nancy Lal, DO   Stopped at 12/07/23 1232    dexmedetomidine (PRECEDEX) 400 mcg in sodium chloride 0.9 % 100 mL infusion  0.1-1.5 mcg/kg/hr IntraVENous Continuous Nancy Lal, DO 12 mL/hr at 12/09/23 1050 0.8 mcg/kg/hr at 12/09/23 1050    Glycopyrrolate injection 0.2 mg  0.2 mg IntraVENous TID Nancy Lal, DO   0.2 mg at 12/09/23 1326    carboxymethylcellulose (THERATEARS) 1 % ophthalmic gel 1 drop  1 drop Both Eyes TID Nancy Lal, DO   1 drop at 12/09/23 1326    fosphenytoin (CEREBYX) injection 100 mg PE  100

## 2023-12-10 LAB
ALBUMIN SERPL-MCNC: 2.7 G/DL (ref 3.5–5.2)
ALLENS TEST: ABNORMAL
ALP SERPL-CCNC: 45 U/L (ref 40–130)
ALT SERPL-CCNC: 52 U/L (ref 5–41)
ANION GAP SERPL CALCULATED.3IONS-SCNC: 10 MMOL/L (ref 7–19)
AST SERPL-CCNC: 82 U/L (ref 5–40)
BASE EXCESS ARTERIAL: 5.6 MMOL/L (ref -2–2)
BASOPHILS # BLD: 0 K/UL (ref 0–0.2)
BASOPHILS NFR BLD: 0.2 % (ref 0–1)
BILIRUB SERPL-MCNC: 0.5 MG/DL (ref 0.2–1.2)
BUN SERPL-MCNC: 24 MG/DL (ref 8–23)
CALCIUM SERPL-MCNC: 8.1 MG/DL (ref 8.8–10.2)
CARBOXYHEMOGLOBIN ARTERIAL: 2 % (ref 0–5)
CHLORIDE SERPL-SCNC: 109 MMOL/L (ref 98–111)
CO2 SERPL-SCNC: 23 MMOL/L (ref 22–29)
CREAT SERPL-MCNC: 0.6 MG/DL (ref 0.5–1.2)
EOSINOPHIL # BLD: 0 K/UL (ref 0–0.6)
EOSINOPHIL NFR BLD: 0 % (ref 0–5)
ERYTHROCYTE [DISTWIDTH] IN BLOOD BY AUTOMATED COUNT: 14.1 % (ref 11.5–14.5)
FIO2: 30 %
GLUCOSE SERPL-MCNC: 207 MG/DL (ref 74–109)
HCO3 ARTERIAL: 28.6 MMOL/L (ref 22–26)
HCT VFR BLD AUTO: 31.5 % (ref 42–52)
HEMOGLOBIN, ART, EXTENDED: 10.6 G/DL (ref 14–18)
HGB BLD-MCNC: 10.3 G/DL (ref 14–18)
IMM GRANULOCYTES # BLD: 0 K/UL
LYMPHOCYTES # BLD: 0.6 K/UL (ref 1.1–4.5)
LYMPHOCYTES NFR BLD: 9.4 % (ref 20–40)
MAGNESIUM SERPL-MCNC: 2.2 MG/DL (ref 1.6–2.4)
MCH RBC QN AUTO: 30.7 PG (ref 27–31)
MCHC RBC AUTO-ENTMCNC: 32.7 G/DL (ref 33–37)
MCV RBC AUTO: 93.8 FL (ref 80–94)
MECHANICAL RATE IN BPM: 16
METHEMOGLOBIN ARTERIAL: 1.2 %
MODE: ABNORMAL
MONOCYTES # BLD: 1.3 K/UL (ref 0–0.9)
MONOCYTES NFR BLD: 20.4 % (ref 0–10)
NEUTROPHILS # BLD: 4.4 K/UL (ref 1.5–7.5)
NEUTS SEG NFR BLD: 69.7 % (ref 50–65)
O2 CONTENT ARTERIAL: 14 ML/DL
O2 SAT, ARTERIAL: 93.5 %
O2 THERAPY: ABNORMAL
PCO2 ARTERIAL: 35 MMHG (ref 35–45)
PH ARTERIAL: 7.52 (ref 7.35–7.45)
PHENYTOIN SERPL-MCNC: 16.8 UG/ML (ref 10–20)
PHOSPHATE SERPL-MCNC: 2.9 MG/DL (ref 2.5–4.5)
PLATELET # BLD AUTO: 149 K/UL (ref 130–400)
PMV BLD AUTO: 9.6 FL (ref 9.4–12.4)
PO2 ARTERIAL: 67 MMHG (ref 80–100)
POSITIVE END EXP PRESS: 5
POTASSIUM BLD-SCNC: 3.3 MMOL/L
POTASSIUM SERPL-SCNC: 3.9 MMOL/L (ref 3.5–5)
PROT SERPL-MCNC: 5.2 G/DL (ref 6.6–8.7)
RBC # BLD AUTO: 3.36 M/UL (ref 4.7–6.1)
SAMPLE SOURCE: ABNORMAL
SODIUM SERPL-SCNC: 142 MMOL/L (ref 136–145)
VT MECHANICAL: 450 %
WBC # BLD AUTO: 6.3 K/UL (ref 4.8–10.8)

## 2023-12-10 PROCEDURE — C9113 INJ PANTOPRAZOLE SODIUM, VIA: HCPCS | Performed by: NEUROLOGICAL SURGERY

## 2023-12-10 PROCEDURE — 2000000000 HC ICU R&B

## 2023-12-10 PROCEDURE — 99233 SBSQ HOSP IP/OBS HIGH 50: CPT | Performed by: PSYCHIATRY & NEUROLOGY

## 2023-12-10 PROCEDURE — 84100 ASSAY OF PHOSPHORUS: CPT

## 2023-12-10 PROCEDURE — 83735 ASSAY OF MAGNESIUM: CPT

## 2023-12-10 PROCEDURE — 2700000000 HC OXYGEN THERAPY PER DAY

## 2023-12-10 PROCEDURE — 80185 ASSAY OF PHENYTOIN TOTAL: CPT

## 2023-12-10 PROCEDURE — 80053 COMPREHEN METABOLIC PANEL: CPT

## 2023-12-10 PROCEDURE — 94003 VENT MGMT INPAT SUBQ DAY: CPT

## 2023-12-10 PROCEDURE — 6360000002 HC RX W HCPCS: Performed by: PSYCHIATRY & NEUROLOGY

## 2023-12-10 PROCEDURE — 6360000002 HC RX W HCPCS: Performed by: NEUROLOGICAL SURGERY

## 2023-12-10 PROCEDURE — 2500000003 HC RX 250 WO HCPCS: Performed by: INTERNAL MEDICINE

## 2023-12-10 PROCEDURE — 85025 COMPLETE CBC W/AUTO DIFF WBC: CPT

## 2023-12-10 PROCEDURE — 36600 WITHDRAWAL OF ARTERIAL BLOOD: CPT

## 2023-12-10 PROCEDURE — 95714 VEEG EA 12-26 HR UNMNTR: CPT

## 2023-12-10 PROCEDURE — 6360000002 HC RX W HCPCS: Performed by: INTERNAL MEDICINE

## 2023-12-10 PROCEDURE — 36415 COLL VENOUS BLD VENIPUNCTURE: CPT

## 2023-12-10 PROCEDURE — 95720 EEG PHY/QHP EA INCR W/VEEG: CPT | Performed by: PSYCHIATRY & NEUROLOGY

## 2023-12-10 PROCEDURE — 2580000003 HC RX 258: Performed by: NEUROLOGICAL SURGERY

## 2023-12-10 PROCEDURE — 82803 BLOOD GASES ANY COMBINATION: CPT

## 2023-12-10 PROCEDURE — 99291 CRITICAL CARE FIRST HOUR: CPT | Performed by: NEUROLOGICAL SURGERY

## 2023-12-10 PROCEDURE — 6370000000 HC RX 637 (ALT 250 FOR IP): Performed by: INTERNAL MEDICINE

## 2023-12-10 RX ADMIN — LEVETIRACETAM 1000 MG: 100 INJECTION, SOLUTION, CONCENTRATE INTRAVENOUS at 10:55

## 2023-12-10 RX ADMIN — FOSPHENYTOIN SODIUM 100 MG PE: 50 INJECTION INTRAMUSCULAR; INTRAVENOUS at 00:15

## 2023-12-10 RX ADMIN — GLYCOPYRROLATE 0.2 MG: 0.2 INJECTION INTRAMUSCULAR; INTRAVENOUS at 14:22

## 2023-12-10 RX ADMIN — LEVETIRACETAM 1000 MG: 100 INJECTION, SOLUTION, CONCENTRATE INTRAVENOUS at 22:59

## 2023-12-10 RX ADMIN — FOSPHENYTOIN SODIUM 100 MG PE: 50 INJECTION INTRAMUSCULAR; INTRAVENOUS at 16:54

## 2023-12-10 RX ADMIN — CARBOXYMETHYLCELLULOSE SODIUM 1 DROP: 10 GEL OPHTHALMIC at 14:22

## 2023-12-10 RX ADMIN — SODIUM CHLORIDE, PRESERVATIVE FREE 40 MG: 5 INJECTION INTRAVENOUS at 07:24

## 2023-12-10 RX ADMIN — LEVETIRACETAM 1000 MG: 100 INJECTION, SOLUTION, CONCENTRATE INTRAVENOUS at 00:15

## 2023-12-10 RX ADMIN — THERA TABS 1 TABLET: TAB at 07:24

## 2023-12-10 RX ADMIN — CARBOXYMETHYLCELLULOSE SODIUM 1 DROP: 10 GEL OPHTHALMIC at 19:55

## 2023-12-10 RX ADMIN — FOLIC ACID 1 MG: 1 TABLET ORAL at 07:24

## 2023-12-10 RX ADMIN — DEXMEDETOMIDINE HYDROCHLORIDE 0.8 MCG/KG/HR: 400 INJECTION, SOLUTION INTRAVENOUS at 19:52

## 2023-12-10 RX ADMIN — DEXMEDETOMIDINE HYDROCHLORIDE 0.8 MCG/KG/HR: 400 INJECTION, SOLUTION INTRAVENOUS at 11:17

## 2023-12-10 RX ADMIN — CARBOXYMETHYLCELLULOSE SODIUM 1 DROP: 10 GEL OPHTHALMIC at 07:26

## 2023-12-10 RX ADMIN — THIAMINE HYDROCHLORIDE 100 MG: 100 INJECTION, SOLUTION INTRAMUSCULAR; INTRAVENOUS at 07:24

## 2023-12-10 RX ADMIN — FOSPHENYTOIN SODIUM 100 MG PE: 50 INJECTION INTRAMUSCULAR; INTRAVENOUS at 07:24

## 2023-12-10 RX ADMIN — FOSPHENYTOIN SODIUM 100 MG PE: 50 INJECTION INTRAMUSCULAR; INTRAVENOUS at 22:59

## 2023-12-10 RX ADMIN — GLYCOPYRROLATE 0.2 MG: 0.2 INJECTION INTRAMUSCULAR; INTRAVENOUS at 19:55

## 2023-12-10 RX ADMIN — GLYCOPYRROLATE 0.2 MG: 0.2 INJECTION INTRAMUSCULAR; INTRAVENOUS at 07:24

## 2023-12-10 RX ADMIN — DEXMEDETOMIDINE HYDROCHLORIDE 0.8 MCG/KG/HR: 400 INJECTION, SOLUTION INTRAVENOUS at 03:09

## 2023-12-10 ASSESSMENT — PULMONARY FUNCTION TESTS
PIF_VALUE: 19
PIF_VALUE: 15
PIF_VALUE: 17
PIF_VALUE: 17
PIF_VALUE: 16
PIF_VALUE: 15
PIF_VALUE: 15
PIF_VALUE: 19
PIF_VALUE: 14
PIF_VALUE: 16
PIF_VALUE: 17
PIF_VALUE: 17
PIF_VALUE: 18
PIF_VALUE: 15
PIF_VALUE: 17
PIF_VALUE: 17
PIF_VALUE: 15
PIF_VALUE: 16
PIF_VALUE: 16
PIF_VALUE: 14
PIF_VALUE: 15
PIF_VALUE: 16
PIF_VALUE: 16
PIF_VALUE: 15
PIF_VALUE: 14

## 2023-12-10 ASSESSMENT — PAIN SCALES - GENERAL
PAINLEVEL_OUTOF10: 0

## 2023-12-10 NOTE — PROGRESS NOTES
[facility] Neurosurgery Critical Care Note        CHIEF COMPLAINT:  Altered mental status      INTERVAL UPDATE:    Patient seen and examined in ICU. He remains intubated has Precedex for sedation. He remains on cEEG monitoring. He remains of fosphenytoin and keppra. Neurology reports discharges consistent with severe TBI, no electrographic seizure activity. He is minimally responsive this AM.      HPI:    The patient is a 80 y.o. adult who presented to the [facility] ED by EMS after a fall down 4-5 steps and sustaining significant head trauma. He is currently nonverbal and history is obtained from nursing and review of medical records. He is apparently visiting from [place] with his wife and was unloading his car last evening when he sustaining a fall down some stairs. He was found down by his wife and EMS was called. He was reported to be minimally responsive by EMS and was transported to the [facility] ED. In the ED, a CT of his head was obtained revealing a skull fracture and multi-compartment intracranial hemorrhage and I have been asked to provide neurosurgical consultation. After arrival in the ED, he had a witnessed seizure and was treated with Ativan and started on keppra. He has not had any further seizure activity. He had an occipital scalp laceration that was closed in the ED. He was somnolent after the seizure but has recovered somewhat overnight. His head CT was repeated after the seizure and again this AM.  He is  currently awake and regards but is nonverbal.  Any attempts at speech are garbled and nonsensical.  He is moving all extremities spontaneously and symmetrically but is not following any commands. He is mildly agitated this AM.  He has a history of pacemaker placement and prior medical records suggest that he was taking baby aspiring prior to admission.          PHYSICAL EXAM:    Vitals:    12/10/23 1000   BP: (!) 112/58   Pulse: 74   Resp: 17   Temp:    SpO2: 99%       Constitutional:

## 2023-12-10 NOTE — PROGRESS NOTES
Patient coughing against vent and biting tube setting alarms off. Precedex gtt restarted at half of previous running rate.

## 2023-12-10 NOTE — PROGRESS NOTES
hours.  BNP:  No results for input(s): \"BNP\" in the last 72 hours. Ionized Calcium:No results for input(s): \"IONCA\" in the last 72 hours. Magnesium:  Recent Labs     12/08/23  0349 12/10/23  0137   MG 2.4 2.2     Phosphorus:  Recent Labs     12/08/23  0349 12/10/23  0137   PHOS 2.2* 2.9     HgbA1C: No results for input(s): \"LABA1C\" in the last 72 hours. Hepatic:   Recent Labs     12/08/23  0349 12/10/23  0137   ALKPHOS 42 45   ALT 5 52*   AST 16 82*   PROT 5.1* 5.2*   BILITOT 0.7 0.5   LABALBU 2.9* 2.7*     Lactic Acid: No results for input(s): \"LACTA\" in the last 72 hours. Troponin: No results for input(s): \"CKTOTAL\", \"CKMB\", \"TROPONINT\" in the last 72 hours. ABGs: No results for input(s): \"PH\", \"PCO2\", \"PO2\", \"HCO3\", \"O2SAT\" in the last 72 hours. CRP:  No results for input(s): \"CRP\" in the last 72 hours. Sed Rate:  No results for input(s): \"SEDRATE\" in the last 72 hours. Cultures:   No results for input(s): \"CULTURE\" in the last 72 hours. No results for input(s): \"BC\", \"BLOODCULT2\" in the last 72 hours. No results for input(s): \"CXSURG\" in the last 72 hours. Radiology reports as per the Radiologist  Radiology: CT HEAD WO CONTRAST    Result Date: 12/5/2023  EXAM:  CT HEAD WITHOUT CONTRAST. HISTORY:  Intracranial hemorrhage follow up. COMPARISON:  1 day prior. TECHNIQUE:  Multiple axial images of the brain were obtained from the skull base through the vertex without intravenous contrast.  Multiplanar reformats were provided. FINDINGS:  Multifocal subarachnoid hemorrhage, greatest at the vertex bilaterally but also in the anterior right frontal and both temporal regions again noted, which subjectively may have slightly increased although overall is probably not significantly changed from prior study. Mixed density right subdural collection measures up to 0.4 cm right frontal temporal region on axial image 19. Left sided subdural fluid measures up to 0.4 cm left frontal region on axial image 19. swelling.  Predental space is not widened.  Severe multilevel disc space narrowing with endplate sclerosis and osteophyte formation.  Bony spinal canal is not significantly compromised.  Moderate to severe multilevel bilateral neural foraminal narrowing secondary to uncovertebral and facet hypertrophy.  Osteopenia.      Impression: 1.  No acute osseous abnormality of the cervical spine. 2.  Severe degenerative changes  All CT scans are performed using dose optimization techniques as appropriate to the performed exam and include at least one of the following: Automated exposure control, adjustment of the mA and/or kV according to size, and the use of iterative reconstruction technique.  ______________________________________ Electronically signed by: ALFRED BENNETT M.D. Date:     12/04/2023 Time:    19:24     XR PELVIS (1-2 VIEWS)    Result Date: 12/4/2023  EXAM:  SINGLE VIEW OF THE PELVIS.  History:  Pelvic trauma.  FINDINGS:  No acute fracture or dislocation.  Grossly intact left hip arthroplasty hardware.  Atherosclerotic vascular calcifications.  Mild to moderate narrowing of the right hip joint.      Impression:  No acute osseous abnormality   ______________________________________ Electronically signed by: ALFRED BENNETT M.D. Date:     12/04/2023 Time:    19:30        Assessment     S/P fall with multiple intracranial hemorrhages.  No surgical intervention at present.  Continue supportive care.  Avoid anticoagulation.     Acute hypoxemic respiratory failure.  Continue ventilatory support.     Family contemplating transition to hospice tomorrow..     Please document 40 minutes of critical care time for patient assessment, chart review, discussion with staff, .      Sage Ulloa, DO

## 2023-12-11 VITALS
RESPIRATION RATE: 21 BRPM | OXYGEN SATURATION: 93 % | SYSTOLIC BLOOD PRESSURE: 148 MMHG | HEART RATE: 75 BPM | HEIGHT: 72 IN | DIASTOLIC BLOOD PRESSURE: 67 MMHG | BODY MASS INDEX: 18.49 KG/M2 | TEMPERATURE: 102.5 F | WEIGHT: 136.5 LBS

## 2023-12-11 PROBLEM — S09.90XA HEAD INJURY: Status: RESOLVED | Noted: 2023-12-05 | Resolved: 2023-12-11

## 2023-12-11 PROBLEM — I10 HYPERTENSION: Status: RESOLVED | Noted: 2023-12-07 | Resolved: 2023-12-11

## 2023-12-11 PROBLEM — R56.1 POST-TRAUMATIC SEIZURES (HCC): Status: ACTIVE | Noted: 2023-12-11

## 2023-12-11 PROBLEM — Z95.0 PACEMAKER: Status: RESOLVED | Noted: 2023-12-07 | Resolved: 2023-12-11

## 2023-12-11 PROBLEM — S06.33AA: Status: ACTIVE | Noted: 2023-12-11

## 2023-12-11 PROBLEM — Z51.5 PALLIATIVE CARE PATIENT: Status: RESOLVED | Noted: 2023-12-07 | Resolved: 2023-12-11

## 2023-12-11 PROBLEM — I62.9 INTRACRANIAL HEMORRHAGE (HCC): Status: RESOLVED | Noted: 2023-12-04 | Resolved: 2023-12-11

## 2023-12-11 PROBLEM — S02.91XB: Status: ACTIVE | Noted: 2023-12-11

## 2023-12-11 PROBLEM — R56.1 POST-TRAUMATIC SEIZURES (HCC): Status: RESOLVED | Noted: 2023-12-11 | Resolved: 2023-12-11

## 2023-12-11 PROBLEM — E78.5 HYPERLIPIDEMIA: Status: RESOLVED | Noted: 2023-12-07 | Resolved: 2023-12-11

## 2023-12-11 PROBLEM — S06.33AA: Status: RESOLVED | Noted: 2023-12-11 | Resolved: 2023-12-11

## 2023-12-11 PROBLEM — I42.9 CARDIOMYOPATHY (HCC): Status: RESOLVED | Noted: 2023-12-07 | Resolved: 2023-12-11

## 2023-12-11 PROBLEM — E43 SEVERE MALNUTRITION (HCC): Status: RESOLVED | Noted: 2023-12-07 | Resolved: 2023-12-11

## 2023-12-11 PROBLEM — S06.9XAA TBI (TRAUMATIC BRAIN INJURY) (HCC): Status: ACTIVE | Noted: 2023-12-11

## 2023-12-11 PROBLEM — S06.9XAA TBI (TRAUMATIC BRAIN INJURY) (HCC): Status: RESOLVED | Noted: 2023-12-11 | Resolved: 2023-12-11

## 2023-12-11 PROBLEM — J96.01 ACUTE RESPIRATORY FAILURE WITH HYPOXIA (HCC): Status: RESOLVED | Noted: 2023-12-07 | Resolved: 2023-12-11

## 2023-12-11 PROBLEM — G40.919 INTRACTABLE SEIZURES (HCC): Status: RESOLVED | Noted: 2023-12-09 | Resolved: 2023-12-11

## 2023-12-11 PROBLEM — J96.01 ACUTE RESPIRATORY FAILURE WITH HYPOXIA (HCC): Status: ACTIVE | Noted: 2023-12-11

## 2023-12-11 PROBLEM — M19.90 OSTEOARTHRITIS: Status: RESOLVED | Noted: 2023-12-07 | Resolved: 2023-12-11

## 2023-12-11 PROBLEM — S02.91XB: Status: RESOLVED | Noted: 2023-12-11 | Resolved: 2023-12-11

## 2023-12-11 LAB
B PARAP IS1001 DNA NPH QL NAA+NON-PROBE: NOT DETECTED
B PERT.PT PRMT NPH QL NAA+NON-PROBE: NOT DETECTED
C PNEUM DNA NPH QL NAA+NON-PROBE: NOT DETECTED
FLUAV RNA NPH QL NAA+NON-PROBE: NOT DETECTED
FLUBV RNA NPH QL NAA+NON-PROBE: NOT DETECTED
HADV DNA NPH QL NAA+NON-PROBE: NOT DETECTED
HCOV 229E RNA NPH QL NAA+NON-PROBE: NOT DETECTED
HCOV HKU1 RNA NPH QL NAA+NON-PROBE: NOT DETECTED
HCOV NL63 RNA NPH QL NAA+NON-PROBE: NOT DETECTED
HCOV OC43 RNA NPH QL NAA+NON-PROBE: NOT DETECTED
HMPV RNA NPH QL NAA+NON-PROBE: NOT DETECTED
HPIV1 RNA NPH QL NAA+NON-PROBE: NOT DETECTED
HPIV2 RNA NPH QL NAA+NON-PROBE: NOT DETECTED
HPIV3 RNA NPH QL NAA+NON-PROBE: NOT DETECTED
HPIV4 RNA NPH QL NAA+NON-PROBE: NOT DETECTED
M PNEUMO DNA NPH QL NAA+NON-PROBE: NOT DETECTED
PHENYTOIN SERPL-MCNC: 19.7 UG/ML (ref 10–20)
RSV RNA NPH QL NAA+NON-PROBE: NOT DETECTED
RV+EV RNA NPH QL NAA+NON-PROBE: NOT DETECTED
SARS-COV-2 RNA NPH QL NAA+NON-PROBE: NOT DETECTED

## 2023-12-11 PROCEDURE — 2580000003 HC RX 258: Performed by: NEUROLOGICAL SURGERY

## 2023-12-11 PROCEDURE — 94760 N-INVAS EAR/PLS OXIMETRY 1: CPT

## 2023-12-11 PROCEDURE — 6370000000 HC RX 637 (ALT 250 FOR IP): Performed by: INTERNAL MEDICINE

## 2023-12-11 PROCEDURE — 6360000002 HC RX W HCPCS: Performed by: INTERNAL MEDICINE

## 2023-12-11 PROCEDURE — 2500000003 HC RX 250 WO HCPCS: Performed by: INTERNAL MEDICINE

## 2023-12-11 PROCEDURE — 6360000002 HC RX W HCPCS: Performed by: PSYCHIATRY & NEUROLOGY

## 2023-12-11 PROCEDURE — 6370000000 HC RX 637 (ALT 250 FOR IP): Performed by: NURSE PRACTITIONER

## 2023-12-11 PROCEDURE — 99232 SBSQ HOSP IP/OBS MODERATE 35: CPT | Performed by: PSYCHIATRY & NEUROLOGY

## 2023-12-11 PROCEDURE — C9113 INJ PANTOPRAZOLE SODIUM, VIA: HCPCS | Performed by: NEUROLOGICAL SURGERY

## 2023-12-11 PROCEDURE — 80185 ASSAY OF PHENYTOIN TOTAL: CPT

## 2023-12-11 PROCEDURE — 0202U NFCT DS 22 TRGT SARS-COV-2: CPT

## 2023-12-11 PROCEDURE — 95714 VEEG EA 12-26 HR UNMNTR: CPT

## 2023-12-11 PROCEDURE — 6360000002 HC RX W HCPCS: Performed by: NEUROLOGICAL SURGERY

## 2023-12-11 PROCEDURE — 99291 CRITICAL CARE FIRST HOUR: CPT | Performed by: NEUROLOGICAL SURGERY

## 2023-12-11 PROCEDURE — 95720 EEG PHY/QHP EA INCR W/VEEG: CPT | Performed by: PSYCHIATRY & NEUROLOGY

## 2023-12-11 PROCEDURE — 36415 COLL VENOUS BLD VENIPUNCTURE: CPT

## 2023-12-11 PROCEDURE — 2700000000 HC OXYGEN THERAPY PER DAY

## 2023-12-11 PROCEDURE — 94003 VENT MGMT INPAT SUBQ DAY: CPT

## 2023-12-11 PROCEDURE — 99233 SBSQ HOSP IP/OBS HIGH 50: CPT | Performed by: PHYSICIAN ASSISTANT

## 2023-12-11 RX ORDER — DEXMEDETOMIDINE HYDROCHLORIDE 4 UG/ML
.1-1.5 INJECTION, SOLUTION INTRAVENOUS CONTINUOUS
OUTPATIENT
Start: 2023-12-11

## 2023-12-11 RX ORDER — SODIUM CHLORIDE 9 MG/ML
INJECTION, SOLUTION INTRAVENOUS PRN
OUTPATIENT
Start: 2023-12-11

## 2023-12-11 RX ORDER — CARBOXYMETHYLCELLULOSE SODIUM 10 MG/ML
1 GEL OPHTHALMIC 3 TIMES DAILY
OUTPATIENT
Start: 2023-12-11

## 2023-12-11 RX ORDER — LORAZEPAM 2 MG/ML
2 INJECTION INTRAMUSCULAR EVERY 4 HOURS PRN
OUTPATIENT
Start: 2023-12-11

## 2023-12-11 RX ORDER — SODIUM CHLORIDE 0.9 % (FLUSH) 0.9 %
5-40 SYRINGE (ML) INJECTION PRN
OUTPATIENT
Start: 2023-12-11

## 2023-12-11 RX ORDER — POLYETHYLENE GLYCOL 3350 17 G/17G
17 POWDER, FOR SOLUTION ORAL DAILY PRN
OUTPATIENT
Start: 2023-12-11

## 2023-12-11 RX ORDER — HYDRALAZINE HYDROCHLORIDE 20 MG/ML
10 INJECTION INTRAMUSCULAR; INTRAVENOUS EVERY 6 HOURS PRN
OUTPATIENT
Start: 2023-12-11

## 2023-12-11 RX ORDER — ACETAMINOPHEN 650 MG/1
650 SUPPOSITORY RECTAL EVERY 6 HOURS PRN
OUTPATIENT
Start: 2023-12-11

## 2023-12-11 RX ORDER — FOSPHENYTOIN SODIUM 50 MG/ML
100 INJECTION, SOLUTION INTRAMUSCULAR; INTRAVENOUS EVERY 8 HOURS
OUTPATIENT
Start: 2023-12-11

## 2023-12-11 RX ORDER — FOLIC ACID 1 MG/1
1 TABLET ORAL DAILY
OUTPATIENT
Start: 2023-12-12

## 2023-12-11 RX ORDER — METOPROLOL TARTRATE 1 MG/ML
2.5 INJECTION, SOLUTION INTRAVENOUS EVERY 6 HOURS PRN
OUTPATIENT
Start: 2023-12-11

## 2023-12-11 RX ORDER — GLYCOPYRROLATE 0.2 MG/ML
0.2 INJECTION INTRAMUSCULAR; INTRAVENOUS 3 TIMES DAILY
OUTPATIENT
Start: 2023-12-11

## 2023-12-11 RX ORDER — THIAMINE HYDROCHLORIDE 100 MG/ML
100 INJECTION, SOLUTION INTRAMUSCULAR; INTRAVENOUS DAILY
OUTPATIENT
Start: 2023-12-12

## 2023-12-11 RX ORDER — PROMETHAZINE HYDROCHLORIDE 25 MG/ML
6.25 INJECTION, SOLUTION INTRAMUSCULAR; INTRAVENOUS EVERY 6 HOURS PRN
OUTPATIENT
Start: 2023-12-11

## 2023-12-11 RX ORDER — ERGOCALCIFEROL 1.25 MG/1
50000 CAPSULE ORAL WEEKLY
OUTPATIENT
Start: 2023-12-12

## 2023-12-11 RX ORDER — SODIUM CHLORIDE 9 MG/ML
INJECTION, SOLUTION INTRAVENOUS CONTINUOUS
OUTPATIENT
Start: 2023-12-11

## 2023-12-11 RX ORDER — ONDANSETRON 2 MG/ML
4 INJECTION INTRAMUSCULAR; INTRAVENOUS EVERY 6 HOURS PRN
OUTPATIENT
Start: 2023-12-11

## 2023-12-11 RX ORDER — ONDANSETRON 4 MG/1
4 TABLET, ORALLY DISINTEGRATING ORAL EVERY 8 HOURS PRN
OUTPATIENT
Start: 2023-12-11

## 2023-12-11 RX ORDER — MAGNESIUM SULFATE IN WATER 40 MG/ML
2000 INJECTION, SOLUTION INTRAVENOUS PRN
OUTPATIENT
Start: 2023-12-11

## 2023-12-11 RX ORDER — ACETAMINOPHEN 325 MG/1
650 TABLET ORAL EVERY 6 HOURS PRN
OUTPATIENT
Start: 2023-12-11

## 2023-12-11 RX ORDER — POTASSIUM CHLORIDE 29.8 MG/ML
20 INJECTION INTRAVENOUS PRN
OUTPATIENT
Start: 2023-12-11

## 2023-12-11 RX ORDER — POTASSIUM CHLORIDE 7.45 MG/ML
10 INJECTION INTRAVENOUS PRN
OUTPATIENT
Start: 2023-12-11

## 2023-12-11 RX ORDER — MULTIVITAMIN WITH IRON
1 TABLET ORAL DAILY
OUTPATIENT
Start: 2023-12-12

## 2023-12-11 RX ORDER — LEVETIRACETAM 500 MG/5ML
1000 INJECTION, SOLUTION, CONCENTRATE INTRAVENOUS EVERY 12 HOURS
OUTPATIENT
Start: 2023-12-11

## 2023-12-11 RX ADMIN — FOSPHENYTOIN SODIUM 100 MG PE: 50 INJECTION INTRAMUSCULAR; INTRAVENOUS at 15:00

## 2023-12-11 RX ADMIN — THERA TABS 1 TABLET: TAB at 09:39

## 2023-12-11 RX ADMIN — FOLIC ACID 1 MG: 1 TABLET ORAL at 09:39

## 2023-12-11 RX ADMIN — FOSPHENYTOIN SODIUM 100 MG PE: 50 INJECTION INTRAMUSCULAR; INTRAVENOUS at 08:30

## 2023-12-11 RX ADMIN — GLYCOPYRROLATE 0.2 MG: 0.2 INJECTION INTRAMUSCULAR; INTRAVENOUS at 09:40

## 2023-12-11 RX ADMIN — CARBOXYMETHYLCELLULOSE SODIUM 1 DROP: 10 GEL OPHTHALMIC at 09:34

## 2023-12-11 RX ADMIN — DEXMEDETOMIDINE HYDROCHLORIDE 0.8 MCG/KG/HR: 400 INJECTION, SOLUTION INTRAVENOUS at 01:28

## 2023-12-11 RX ADMIN — CARBOXYMETHYLCELLULOSE SODIUM 1 DROP: 10 GEL OPHTHALMIC at 14:30

## 2023-12-11 RX ADMIN — ACETAMINOPHEN 650 MG: 325 TABLET ORAL at 14:55

## 2023-12-11 RX ADMIN — LEVETIRACETAM 1000 MG: 100 INJECTION, SOLUTION, CONCENTRATE INTRAVENOUS at 10:00

## 2023-12-11 RX ADMIN — THIAMINE HYDROCHLORIDE 100 MG: 100 INJECTION, SOLUTION INTRAMUSCULAR; INTRAVENOUS at 09:40

## 2023-12-11 RX ADMIN — SODIUM CHLORIDE, PRESERVATIVE FREE 40 MG: 5 INJECTION INTRAVENOUS at 09:39

## 2023-12-11 RX ADMIN — DEXMEDETOMIDINE HYDROCHLORIDE 1 MCG/KG/HR: 400 INJECTION, SOLUTION INTRAVENOUS at 15:31

## 2023-12-11 RX ADMIN — GLYCOPYRROLATE 0.2 MG: 0.2 INJECTION INTRAMUSCULAR; INTRAVENOUS at 14:31

## 2023-12-11 ASSESSMENT — PULMONARY FUNCTION TESTS
PIF_VALUE: 18
PIF_VALUE: 17
PIF_VALUE: 16
PIF_VALUE: 15
PIF_VALUE: 17
PIF_VALUE: 16
PIF_VALUE: 19
PIF_VALUE: 17
PIF_VALUE: 18
PIF_VALUE: 16
PIF_VALUE: 15
PIF_VALUE: 16
PIF_VALUE: 14
PIF_VALUE: 16
PIF_VALUE: 15
PIF_VALUE: 14
PIF_VALUE: 13
PIF_VALUE: 9.6
PIF_VALUE: 16
PIF_VALUE: 15
PIF_VALUE: 16
PIF_VALUE: 16

## 2023-12-11 ASSESSMENT — PAIN SCALES - GENERAL
PAINLEVEL_OUTOF10: 0
PAINLEVEL_OUTOF10: 0

## 2023-12-11 NOTE — PROGRESS NOTES
Comprehensive Nutrition Assessment    Type and Reason for Visit:  Reassess    Nutrition Recommendations/Plan:   Continue to work toward goal rate     Malnutrition Assessment:  Malnutrition Status:  Severe malnutrition (12/11/23 1408)    Context:  Acute Illness     Findings of the 6 clinical characteristics of malnutrition:  Energy Intake:  Mild decrease in energy intake (Comment)  Weight Loss:  Unable to assess     Body Fat Loss: Moderate body fat loss Orbital, Buccal region   Muscle Mass Loss: Moderate muscle mass loss Temples (temporalis)  Fluid Accumulation:  Moderate to Severe Extremities   Strength:  Not Performed    Nutrition Assessment:    TF has been advanced to 40ml/hr. Tolerating feeding. Aware family to make decision on possible Hospice admission  No new labs or weight available    Nutrition Related Findings:    +3 BUE edema Wound Type: None       Current Nutrition Intake & Therapies:    Average Meal Intake: NPO  Average Supplements Intake: NPO  Current Tube Feeding (TF) Orders:  Feeding Route: Orogastric  Formula: Other Tube Feeding (Comment)  Schedule: Continuous  Feeding Regimen: Promote with fiber goal rate 68ml/hr  Additives/Modulars: None  Water Flushes: no flush  Current TF & Flush Orders Provides: Promote with fiber @ 30ml/hr = 720 kcals with 45g protein, 93g CHO and 603ml free water from formula  Goal TF & Flush Orders Provides: Promote with fiber @ 68ml/hr = 1632 kcals with 102g protein, 225g CHO and 1356ml free water from formula    Anthropometric Measures:  Height: 182.9 cm (6' 0.01\")  Ideal Body Weight (IBW): 178 lbs (81 kg)    Admission Body Weight: 74.8 kg (165 lb) (stated)  Current Body Weight: 61.9 kg (136 lb 7.4 oz), 74.5 % IBW.  Weight Source: Bed Scale  Current BMI (kg/m2): 18.5  BMI Categories: Underweight (BMI less than 22) age over 72    Estimated Daily Nutrient Needs:  Energy Requirements Based On: Kcal/kg  Weight Used for Energy Requirements: Current  Energy (kcal/day):

## 2023-12-11 NOTE — PROGRESS NOTES
[facility] Neurosurgery Critical Care Note        CHIEF COMPLAINT:  Altered mental status      INTERVAL UPDATE:    Patient seen and examined in ICU. He remains intubated and is requiring Precedex for sedation. His family is at bedside. He remains minimally responsive. Family has elected to transition to hospice care. HPI:    The patient is a 80 y.o. adult who presented to the [facility] ED by EMS after a fall down 4-5 steps and sustaining significant head trauma. He is currently nonverbal and history is obtained from nursing and review of medical records. He is apparently visiting from [place] with his wife and was unloading his car last evening when he sustaining a fall down some stairs. He was found down by his wife and EMS was called. He was reported to be minimally responsive by EMS and was transported to the [facility] ED. In the ED, a CT of his head was obtained revealing a skull fracture and multi-compartment intracranial hemorrhage and I have been asked to provide neurosurgical consultation. After arrival in the ED, he had a witnessed seizure and was treated with Ativan and started on keppra. He has not had any further seizure activity. He had an occipital scalp laceration that was closed in the ED. He was somnolent after the seizure but has recovered somewhat overnight. His head CT was repeated after the seizure and again this AM.  He is  currently awake and regards but is nonverbal.  Any attempts at speech are garbled and nonsensical.  He is moving all extremities spontaneously and symmetrically but is not following any commands. He is mildly agitated this AM.  He has a history of pacemaker placement and prior medical records suggest that he was taking baby aspiring prior to admission. PHYSICAL EXAM:    Vitals:    12/11/23 1028   BP:    Pulse: 80   Resp: 17   Temp:    SpO2: 98%       Constitutional: Ill-appearing.    Eyes - conjunctiva normal.  Pupils react to light  Ear, nose,throat

## 2023-12-11 NOTE — PROCEDURES
[facility] Neurology  [address] [facility], [facility] [address]  Phone [phone]  Fax [phone]       Continuous Video  Electroencephalography    Information:   Patient Name: Marcus Guerrier  :   1940  Age:   80 y.o. MRN:   [id number]  Account #:  [de-identified]  Date of Test:  23 - 2023    Referring Provider:  Elle Manzo D.O. Interpreting Provider: Danilo Westbrook M.D. HISTORY:   Marcus Guerrier is a 80 y.o. man who has had persistent unresponsiveness following a fall  with head injury, traumatic intracranial hemorrhage, and seizures. SUMMARY OF THE RECORDIN hours were recorded. The background rhythm consists of persistent diffuse low amplitude slow activity. Generalized periodic discharges were present throughout much of the recording. These consist of small spikes followed by triphasiform slow waves. For the most part these occur every few seconds but at times more or less frequent or more irregular. No electrographic seizures are noted. IMPRESSION:   Dysrhythmia grade 3;  generalized periodic epileptic discharges. Delta grade 2;  diffuse low amplitude slowing. The findings are most commonly seen with a severe diffuse or bilateral hemisphere brain injury. The risk for epileptic seizures, although controversial, is generally considered high.           Electronically signed by Danilo Westbrook MD

## 2023-12-11 NOTE — PROGRESS NOTES
Palliative Care Progress Note  12/11/2023 11:54 AM    Patient:  Raegan Brar  YOB: 1940  Primary Care Physician: No primary care provider on file. Advance Directive: DNR  Admit Date: 12/4/2023       Hospital Day: 7  Portions of this note have been copied forward, however, changed to reflect the most current clinical status of this patient. CHIEF COMPLAINT/REASON FOR CONSULTATION Goals of care, family support, Code status, symptom management     SUBJECTIVE:  Mr. Justin maria remains minimally responsive. He is febrile this AM w/ thick secretions noted. HPI:  The patient is a 80 y.o. male with PMH cardiomyopathy, s/p pacemaker, HTN, HLD, osteoarthritis who presented to [facility] ED on 12/4/2023 after a fall down 4-5 stairs at home that resulted in head trauma to include a laceration to the back of the head. He is visiting from [place] and had been unloading their vehicle and fell at some point during that. CT facial bones reported right frontal and parietal bone fractures, no acute facial fracture or dislocation, left maxillary sinus disease/ethmoid air cell disease. Chest x-ray reported cardiomegaly and atherosclerosis with pacemaker known. There was also a right clavicular fracture of indeterminate age seen. Pelvic films reported no acute osseous abnormality. CT cervical spine reported no acute osseous abnormality though severe degenerative changes seen. CT head reported large superior full-thickness skull fracture with multiple intracranial hemorrhages. The patient was noted to have a seizure in the ED and was placed on Keppra and given Ativan. He was admitted to hospitalist service with intracranial hemorrhage and skull fracture. He has been followed by neurosurgery and there is no surgical indication at the time of admission. Neurology is following as well and [facility] has been continued.   Over the last few days the patient has been less alert and has developed a left upward gaze

## 2023-12-11 NOTE — PLAN OF CARE
Problem: Nutrition Deficit:  Goal: Optimize nutritional status  12/11/2023 1412 by Kathleen Talavera, MS, RD, LD  Outcome: Progressing  Flowsheets (Taken 12/11/2023 1404)  Nutrient intake appropriate for improving, restoring, or maintaining nutritional needs:   Assess nutritional status and recommend course of action   Recommend, monitor, and adjust tube feedings and TPN/PPN based on assessed needs  12/11/2023 0018 by Ingirs Overton RN  Outcome: Not Progressing     Problem: Discharge Planning  Goal: Discharge to home or other facility with appropriate resources  Recent Flowsheet Documentation  Taken 12/11/2023 0800 by Georgie Singh RN  Discharge to home or other facility with appropriate resources:   Identify barriers to discharge with patient and caregiver   Arrange for needed discharge resources and transportation as appropriate   Identify discharge learning needs (meds, wound care, etc)   Refer to discharge planning if patient needs post-hospital services based on physician order or complex needs related to functional status, cognitive ability or social support system  12/11/2023 0018 by Ingris Overton RN  Outcome: Not Progressing     Problem: Pain  Goal: Verbalizes/displays adequate comfort level or baseline comfort level  Recent Flowsheet Documentation  Taken 12/11/2023 0800 by Georgie Singh RN  Verbalizes/displays adequate comfort level or baseline comfort level:   Encourage patient to monitor pain and request assistance   Assess pain using appropriate pain scale   Administer analgesics based on type and severity of pain and evaluate response   Implement non-pharmacological measures as appropriate and evaluate response  12/11/2023 0018 by Ingris Overton RN  Outcome: Not Progressing     Problem: Safety - Adult  Goal: Free from fall injury  12/11/2023 0018 by Ingris Overton RN  Outcome: Not Progressing     Problem: Skin/Tissue Integrity  Goal: Absence of new skin breakdown  Description: 1.

## 2023-12-11 NOTE — PROGRESS NOTES
[address] [place] [address] Neurology  [address] [facility], [address] [place] [address]  Phone [phone]     Neurology Progress Note  2023 9:16 AM  Information:   Patient Name: Kashif Keita  :   1940  Age:   80 y.o. MRN:   [id number]  Account #:  [de-identified]  Admit Date:   2023  Today:  23     ADMIT DX:   Intracranial hemorrhage ([address])    Subjective:     Kashif Keita is an 79 YO man who was admitted  after a fall with traumatic subarachnoid hemorrhage and acute respiratory failure. He has had intermittent electrographic seizure activity and is on Keppra and fosphenytoin. Interval History:   No clinical seizures. He remains unresponsive and intubated in ICU. Even when Precedex is held, no changes seen. EEG remains the same - slow with injury pattern. Objective:     Past Medical History:  Past Medical History:   Diagnosis Date    Cardiomyopathy ([address])     Hyperlipidemia     Hypertension     Osteoarthritis     Pacemaker     Palliative care patient 2023       Past Surgical History:   Procedure Laterality Date    PACEMAKER PLACEMENT         History reviewed. No pertinent family history.     Social History     Socioeconomic History    Marital status:      Spouse name: Not on file    Number of children: Not on file    Years of education: Not on file    Highest education level: Not on file   Occupational History    Not on file   Tobacco Use    Smoking status: Never    Smokeless tobacco: Never   Substance and Sexual Activity    Alcohol use: Yes     Comment: beer daily    Drug use: Never    Sexual activity: Not on file   Other Topics Concern    Not on file   Social History Narrative    Not on file     Social Determinants of Health     Financial Resource Strain: Not on file   Food Insecurity: Not on file   Transportation Needs: Not on file   Physical Activity: Not on file   Stress: Not on file   Social Connections: Not on file   Intimate Partner Violence: Not on file   Housing Stability: Not on

## 2023-12-11 NOTE — DISCHARGE SUMMARY
Discharge Summary    Kimberley Clay  :  1940  MRN:  [id number]    Admit date:  2023  Discharge date:      Admitting Physician:  No admitting provider for patient encounter. Advance Directive: DNR    Consults: neurology, neurosurgery, palliative care, and hospice    Primary Care Physician:  No primary care provider on file. Discharge Diagnoses:  Principal Problem (Resolved): Intracranial hemorrhage (HCC)  Active Problems:    * No active hospital problems. 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 HISTORY:  Intracranial hemorrhage follow up. COMPARISON:  1 day prior. TECHNIQUE:  Multiple axial images of the brain were obtained from the skull base through the vertex without intravenous contrast.  Multiplanar reformats were provided. FINDINGS:  Multifocal subarachnoid hemorrhage, greatest at the vertex bilaterally but also in the anterior right frontal and both temporal regions again noted, which subjectively may have slightly increased although overall is probably not significantly changed from prior study. Mixed density right subdural collection measures up to 0.4 cm right frontal temporal region on axial image 19. Left sided subdural fluid measures up to 0.4 cm left frontal region on axial image 19. Parafalcine high density is  stable as well measuring up to 0.2 cm thickness and along the left tentorium measuring up to 0.4 cm on coronal image 39. Subdural hemorrhage has not significantly changed.   Stable hemorrhagic contusion in the

## 2023-12-11 NOTE — PROGRESS NOTES
Precedex back on. Pt coughing constantly purulent yellow thick ETT secretions. Biting on ETT. Eyes open but not obeying commands.

## 2023-12-11 NOTE — PROGRESS NOTES
Report called to marito in hospice care center. Changed ETT العلي and posterior scalp dressing prior to transfer to hospice. Dobb too flushed and capped off for transfer. Right upper arm and left ankle ivs flushed and patent. Sites clear.

## 2023-12-11 NOTE — PROGRESS NOTES
This SW was asked to sign this patient into Hospice services so that he can come to the [facility] [address]. This SW met with Chuck Cheung, the patient's spouse, and Lady Ledesma, the patient's daughter, and provided time for them to share their thoughts, feelings, comments, and concerns regarding the patient's current condition and the events that have occurred. They are agreeable to Hospice, and this SW provided the Hospice and the [facility] books. This SW brought them to the [facility] for a tour, and they were appreciative of all that has been done for the patient. This SW completed the necessary evals, and Chuck Cheung requested  home information as they are from [place]. This SW has a printed form that is to be provided to her for reference as she explained that the patient wants to be cremated. This SW informed Khurram Nunes RN, that the patient has been signed into Hospice, and the telephone number for the [facility] was provided so that she can call report. This SW informed Tonja Casey RN that the patient has been signed in. This SW plans to follow and assist in the patient's care at the [facility].

## 2023-12-12 NOTE — PROCEDURES
[facility] Neurology  [address]  [facility], [address] [place] [address]  Phone [phone]  Fax [phone]       Electroencephalography    Information:   Patient Name: Cruz Caro  :   1940  Age:   80 y.o. MRN:   [id number]  Account #:  [de-identified]  Date of Test:  12/10/23 - 2023    Referring Provider:  Sanaz Walker D.O. Interpreting Provider: Kavon Carter M.D. HISTORY:   Cruz Caro is a 80 y.o. man who has had persistent unresponsiveness following a fall  with head injury, traumatic intracranial hemorrhage, and seizures. SUMMARY OF THE RECORDIN hours and 13 minutes were recorded. The background rhythm consists of persistent diffuse low amplitude slow activity. Generalized periodic discharges were present throughout much of the recording. The generalized periodic discharges at times are absent or less conspicuous. No electrographic seizures were present. IMPRESSION:   Dysrhythmia grade 3;  generalized periodic epileptic discharges. Delta grade 2;  diffuse low amplitude slowing. The findings are most commonly seen with a severe diffuse or bilateral hemisphere brain injury. The risk for epileptic seizures, although controversial, is generally considered high.          Electronically signed by Kavon Carter MD

## 2023-12-12 NOTE — PROCEDURES
[facility] Neurology  [address] [facility], [facility] [address]  Phone [phone]  Fax [phone]       Continuous Video  Electroencephalography    Information:   Patient Name: Rima Street  :   1940  Age:   80 y.o. MRN:   [id number]  Account #:  [de-identified]  Date of Test:  23 - 12/10/2023    Referring Provider:  Alberto Patterson D.O. Interpreting Provider: Joanna Hinojosa M.D. HISTORY:   Rima Street is a 80 y.o. man who has had persistent unresponsiveness following a fall  with head injury, traumatic intracranial hemorrhage, and seizures. SUMMARY OF THE RECORDIN hours were recorded. The background rhythm consists of persistent diffuse low amplitude slow activity. Generalized periodic discharges were present throughout much of the recording. The generalized periodic discharges at times are absent or less conspicuous. IMPRESSION:   Dysrhythmia grade 3;  generalized periodic epileptic discharges. Delta grade 2;  diffuse low amplitude slowing. The findings are most commonly seen with a severe diffuse or bilateral hemisphere brain injury. The risk for epileptic seizures, although controversial, is generally considered high.         Electronically signed by Joanna Hinojosa MD